# Patient Record
Sex: MALE | Race: WHITE | NOT HISPANIC OR LATINO | Employment: FULL TIME | URBAN - METROPOLITAN AREA
[De-identification: names, ages, dates, MRNs, and addresses within clinical notes are randomized per-mention and may not be internally consistent; named-entity substitution may affect disease eponyms.]

---

## 2017-02-01 DIAGNOSIS — M54.17 LUMBOSACRAL RADICULOPATHY: ICD-10-CM

## 2017-02-01 NOTE — TELEPHONE ENCOUNTER
Last seen: 12/29/16 by Dr. Erwin  Next appt: 03/10/17 with Dr. Finney    Was the patient seen in the last year in this department? Yes   Does patient have an active prescription for medications requested? No   Received Request Via: Pharmacy          TENAZULMA   Age: 46 demographics Data as of: 2/1/2017     Rx Graph   Narcotic Sedative Stimulant   ALL PRESCRIBERS   02/[id number] - JIMMIE, MARIETTA2 - ZOLLINGER, CLAUDIA3 - RODRI, MICHEALA4 - CHELSIE, AMEE5 - MD SHYAM, RADHA6 - KATTY, SAILAJA7 - JR VIZCAINO MD, [place]8 - RENE CHANG   Morphine MgEq/day   02/[id number]     Data Analysis     Prescriptions Total Prescriptions: 22 Active MME: 0.00 Active MME/day: 0.00 30 Day Avg. MME/day: 5.00   Fill Date  Drug Qty Days Prescriber Pharmacy Refill MgEq MgEq/Day Pymt Type    01/10/2017  OXYCODONE HCL 5 MG TABLET 20 10 AR DONNIE LONGS  0 150.00 15.00 [facility] [place]   12/11/2016  OXYCODONE HCL 5 MG TABLET 20 10 AR DONNIE LON[facility]  0 150.00 15.00 [facility] [place]   11/14/2016  OXYCODONE HCL 5 MG TABLET 20 5 JE ZOL LONGS  0 150.00 30.00 [facility] [place]   10/11/2016  OXYCODONE HCL 5 MG TABLET 20 20 LA NAM LONGS  0 150.00 7.50 [facility] [place]   09/13/2016  OXYCODONE HCL 5 MG TABLET 20 20 [facility] NAM [facility]ONGS  0 150.00 7.50 [facility] [place]   08/10/2016  OXYCODONE HCL 5 MG TABLET 20 20 LA NAM LONGS  0 150.00 7.50 [facility] [place]   07/08/2016  OXYCODONE HCL 5 MG TABLET 20 20 CRABTREE WAN LONGS  0 150.00 7.50 [facility] [place]   06/09/2016  OXYCODONE HCL 5 MG TABLET 20 20 CRABTREE WAN LONGS  0 150.00 7.50 [facility] [place]

## 2017-02-02 ENCOUNTER — TELEPHONE (OUTPATIENT)
Dept: INTERNAL MEDICINE | Facility: MEDICAL CENTER | Age: 47
End: 2017-02-02

## 2017-02-02 DIAGNOSIS — M54.17 LUMBOSACRAL RADICULOPATHY: ICD-10-CM

## 2017-02-02 RX ORDER — OXYCODONE HYDROCHLORIDE 5 MG/1
5 TABLET ORAL
Qty: 20 TAB | Refills: 0 | Status: SHIPPED | OUTPATIENT
Start: 2017-02-02 | End: 2017-03-10 | Stop reason: SDUPTHER

## 2017-02-02 RX ORDER — OXYCODONE HYDROCHLORIDE 5 MG/1
5 TABLET ORAL
Qty: 20 TAB | Refills: 0 | Status: CANCELLED | OUTPATIENT
Start: 2017-03-02

## 2017-02-02 NOTE — TELEPHONE ENCOUNTER
Call Documentation      Luann Finney M.D. at 2/2/2017 10:20 AM      Status: Signed         Expand All Collapse All    Refilled Roxicodone 20 tab and gave the prescription to Shira. Thank you.

## 2017-02-02 NOTE — TELEPHONE ENCOUNTER
I checked about this with my attending  and  when the patient was here and i was told that we are very strict on narcotics prescription now. I was told , no more giving the advanced postdated Narcotic refills. Thank you.

## 2017-02-02 NOTE — TELEPHONE ENCOUNTER
Pt is asking for at least 1 more month so he doesn't have to make multiple trips to the office. (We usually give him 3 months at a time). I set up the next RX to sign for March, if you are comfortable to write for April with start date of 4/2 please do so. (He is a reliable pt)

## 2017-03-01 NOTE — TELEPHONE ENCOUNTER
Last seen: 12/29/16 by Dr. Erwin  Next appt: 3/10/17 with Dr. Finney    Was the patient seen in the last year in this department? Yes   Does patient have an active prescription for medications requested? No   Received Request Via: Pharmacy

## 2017-03-02 RX ORDER — GABAPENTIN 300 MG/1
CAPSULE ORAL
Qty: 90 CAP | Refills: 1 | Status: SHIPPED | OUTPATIENT
Start: 2017-03-02 | End: 2017-07-26 | Stop reason: SDUPTHER

## 2017-03-10 ENCOUNTER — OFFICE VISIT (OUTPATIENT)
Dept: INTERNAL MEDICINE | Facility: MEDICAL CENTER | Age: 47
End: 2017-03-10
Payer: COMMERCIAL

## 2017-03-10 VITALS
HEIGHT: 75 IN | SYSTOLIC BLOOD PRESSURE: 144 MMHG | DIASTOLIC BLOOD PRESSURE: 110 MMHG | OXYGEN SATURATION: 96 % | WEIGHT: 210.8 LBS | HEART RATE: 84 BPM | BODY MASS INDEX: 26.21 KG/M2 | TEMPERATURE: 97.4 F

## 2017-03-10 DIAGNOSIS — M54.17 LUMBOSACRAL RADICULOPATHY: ICD-10-CM

## 2017-03-10 DIAGNOSIS — Z79.891 CHRONIC USE OF OPIATE DRUGS THERAPEUTIC PURPOSES: ICD-10-CM

## 2017-03-10 DIAGNOSIS — M47.812 OSTEOARTHRITIS OF CERVICAL SPINE, UNSPECIFIED SPINAL OSTEOARTHRITIS COMPLICATION STATUS: ICD-10-CM

## 2017-03-10 DIAGNOSIS — I10 ESSENTIAL HYPERTENSION: ICD-10-CM

## 2017-03-10 PROBLEM — D50.9 IRON DEFICIENCY ANEMIA: Status: ACTIVE | Noted: 2017-03-10

## 2017-03-10 PROBLEM — D50.9 IRON DEFICIENCY ANEMIA: Status: RESOLVED | Noted: 2017-03-10 | Resolved: 2017-03-10

## 2017-03-10 PROCEDURE — 99214 OFFICE O/P EST MOD 30 MIN: CPT | Mod: GC | Performed by: INTERNAL MEDICINE

## 2017-03-10 RX ORDER — TADALAFIL 2.5 MG/1
2.5 TABLET ORAL
Qty: 30 TAB | Refills: 4 | Status: SHIPPED
Start: 2017-03-10 | End: 2017-10-04 | Stop reason: SDUPTHER

## 2017-03-10 RX ORDER — OXYCODONE HYDROCHLORIDE 5 MG/1
5 TABLET ORAL
Qty: 30 TAB | Refills: 0 | Status: SHIPPED | OUTPATIENT
Start: 2017-03-10 | End: 2017-04-13 | Stop reason: SDUPTHER

## 2017-03-10 ASSESSMENT — PAIN SCALES - GENERAL: PAINLEVEL: 5=MODERATE PAIN

## 2017-03-10 ASSESSMENT — LIFESTYLE VARIABLES: HISTORY_ALCOHOL_USE: 1

## 2017-03-10 ASSESSMENT — ENCOUNTER SYMPTOMS: DEPRESSION: 0

## 2017-03-10 NOTE — TELEPHONE ENCOUNTER
Last seen: 03/10/17 by Dr. Stewart  Next appt: 04/13/17 with Dr. Finney    Was the patient seen in the last year in this department? Yes   Does patient have an active prescription for medications requested? No   Received Request Via: Pharmacy

## 2017-03-10 NOTE — MR AVS SNAPSHOT
"        Gabriele Bland   3/10/2017 8:00 AM   Office Visit   MRN: [id number]    Department:  [facility] - Internal Med   Dept Phone:  [phone]    Description:  Male : 1970   Provider:  Luann Finney M.D.           Reason for Visit     Follow-Up follow up    Medication Refill oxycodone    Ear Pain right ear, thinks might have an ear infection      Allergies as of 3/10/2017     Allergen Noted Reactions    Food 2013       Tree nuts    Ibuprofen 2015       Naproxen 2015       Pcn [Penicillins] 2013       Tylenol 2015         You were diagnosed with     Lumbosacral radiculopathy   [783751]       Osteoarthritis of cervical spine, unspecified spinal osteoarthritis complication status   [3131387]       Essential hypertension   [6440035]         Vital Signs     Blood Pressure Pulse Temperature Height Weight Body Mass Index    144/110 mmHg 84 36.3 °C (97.4 °F) 1.905 m (6' 3\") 95.618 kg (210 lb 12.8 oz) 26.35 kg/m2    Oxygen Saturation Smoking Status                96% Current Every Day Smoker          Basic Information     Date Of Birth Sex Race Ethnicity Preferred Language    1970 Male White Non- English      Your appointments     2017  1:15 PM   Established Patient with Luann Finney M.D.   [facility] - Internal Medicine (--)    [address] [place] [id number] [phone]           You will be receiving a confirmation call a few days before your appointment from our automated call confirmation system.              Problem List              ICD-10-CM Priority Class Noted - Resolved    Vertigo R42   2013 - Present    Cerebellar infarct (HCC) I63.9   2013 - Present    Radiculopathy M54.10   5/10/2016 - Present    Cervical spondylosis M47.812   2016 - Present    Encounter for smoking cessation counseling Z71.6, Z72.0   2016 - Present    Preventative health care Z00.00   2016 - Present   " Encounter for long-term opiate analgesic use Z79.891   12/29/2016 - Present      Health Maintenance        Date Due Completion Dates    IMM PNEUMOCOCCAL 19-64 (ADULT) MEDIUM RISK SERIES (1 of 1 - PPSV23) 12/28/1988 ---    IMM INFLUENZA (1) 9/1/2016 ---    IMM DTaP/Tdap/Td Vaccine (2 - Td) 6/15/2024 6/15/2014            Current Immunizations     Tdap Vaccine 6/15/2014      Below and/or attached are the medications your provider expects you to take. Review all of your home medications and newly ordered medications with your provider and/or pharmacist. Follow medication instructions as directed by your provider and/or pharmacist. Please keep your medication list with you and share with your provider. Update the information when medications are discontinued, doses are changed, or new medications (including over-the-counter products) are added; and carry medication information at all times in the event of emergency situations     Allergies:  FOOD - (reactions not documented)     IBUPROFEN - (reactions not documented)     NAPROXEN - (reactions not documented)     PCN - (reactions not documented)     TYLENOL - (reactions not documented)               Medications  Valid as of: March 10, 2017 -  8:46 AM    Generic Name Brand Name Tablet Size Instructions for use    Gabapentin (Cap) NEURONTIN 300 MG TAKE ONE CAPSULE BY MOUTH AT BEDTIME        Krill Oil (Cap) Krill Oil 300 MG Take  by mouth.        Multiple Vitamins-Minerals (Tab) THERAGRAN-M  Take 1 Tab by mouth every day.        Omega-3 Fatty Acids (Cap) fish oil 1000 MG Take 1,000 mg by mouth 3 times a day, with meals.        OxyCODONE HCl (Tab) ROXICODONE 5 MG Take 1 Tab by mouth 1 time daily as needed for Severe Pain.        Tadalafil (Tab) Tadalafil 2.5 MG Take  by mouth.        .                 Medicines prescribed today were sent to:     [facility][id number] - [place] - [address]    [address] [place] [address]    Phone: [phone] Fax: [phone]    Open 24  Hours?: No      Medication refill instructions:       If your prescription bottle indicates you have medication refills left, it is not necessary to call your provider’s office. Please contact your pharmacy and they will refill your medication.    If your prescription bottle indicates you do not have any refills left, you may request refills at any time through one of the following ways: The online [employer] system (except Urgent Care), by calling your provider’s office, or by asking your pharmacy to contact your provider’s office with a refill request. Medication refills are processed only during regular business hours and may not be available until the next business day. Your provider may request additional information or to have a follow-up visit with you prior to refilling your medication.   *Please Note: Medication refills are assigned a new Rx number when refilled electronically. Your pharmacy may indicate that no refills were authorized even though a new prescription for the same medication is available at the pharmacy. Please request the medicine by name with the pharmacy before contacting your provider for a refill.        Instructions    Hypertension  Hypertension, commonly called high blood pressure, is when the force of blood pumping through your arteries is too strong. Your arteries are the blood vessels that carry blood from your heart throughout your body. A blood pressure reading consists of a higher number over a lower number, such as 110/72. The higher number (systolic) is the pressure inside your arteries when your heart pumps. The lower number (diastolic) is the pressure inside your arteries when your heart relaxes. Ideally you want your blood pressure below 120/80.  Hypertension forces your heart to work harder to pump blood. Your arteries may become narrow or stiff. Having untreated or uncontrolled hypertension can cause heart attack, stroke, kidney disease, and other problems.  RISK  FACTORS  Some risk factors for high blood pressure are controllable. Others are not.   Risk factors you cannot control include:   · Race. You may be at higher risk if you are .  · Age. Risk increases with age.  · Gender. Men are at higher risk than women before age 45 years. After age 65, women are at higher risk than men.  Risk factors you can control include:  · Not getting enough exercise or physical activity.  · Being overweight.  · Getting too much fat, sugar, calories, or salt in your diet.  · Drinking too much alcohol.  SIGNS AND SYMPTOMS  Hypertension does not usually cause signs or symptoms. Extremely high blood pressure (hypertensive crisis) may cause headache, anxiety, shortness of breath, and nosebleed.  DIAGNOSIS  To check if you have hypertension, your health care provider will measure your blood pressure while you are seated, with your arm held at the level of your heart. It should be measured at least twice using the same arm. Certain conditions can cause a difference in blood pressure between your right and left arms. A blood pressure reading that is higher than normal on one occasion does not mean that you need treatment. If it is not clear whether you have high blood pressure, you may be asked to return on a different day to have your blood pressure checked again. Or, you may be asked to monitor your blood pressure at home for 1 or more weeks.  TREATMENT  Treating high blood pressure includes making lifestyle changes and possibly taking medicine. Living a healthy lifestyle can help lower high blood pressure. You may need to change some of your habits.  Lifestyle changes may include:  · Following the DASH diet. This diet is high in fruits, vegetables, and whole grains. It is low in salt, red meat, and added sugars.  · Keep your sodium intake below 2,300 mg per day.  · Getting at least 30-45 minutes of aerobic exercise at least 4 times per week.  · Losing weight if necessary.  · Not  smoking.  · Limiting alcoholic beverages.  · Learning ways to reduce stress.  Your health care provider may prescribe medicine if lifestyle changes are not enough to get your blood pressure under control, and if one of the following is true:  · You are 18-59 years of age and your systolic blood pressure is above 140.  · You are 60 years of age or older, and your systolic blood pressure is above 150.  · Your diastolic blood pressure is above 90.  · You have diabetes, and your systolic blood pressure is over 140 or your diastolic blood pressure is over 90.  · You have kidney disease and your blood pressure is above 140/90.  · You have heart disease and your blood pressure is above 140/90.  Your personal target blood pressure may vary depending on your medical conditions, your age, and other factors.  HOME CARE INSTRUCTIONS  · Have your blood pressure rechecked as directed by your health care provider.    · Take medicines only as directed by your health care provider. Follow the directions carefully. Blood pressure medicines must be taken as prescribed. The medicine does not work as well when you skip doses. Skipping doses also puts you at risk for problems.  · Do not smoke.    · Monitor your blood pressure at home as directed by your health care provider.   SEEK MEDICAL CARE IF:   · You think you are having a reaction to medicines taken.  · You have recurrent headaches or feel dizzy.  · You have swelling in your ankles.  · You have trouble with your vision.  SEEK IMMEDIATE MEDICAL CARE IF:  · You develop a severe headache or confusion.  · You have unusual weakness, numbness, or feel faint.  · You have severe chest or abdominal pain.  · You vomit repeatedly.  · You have trouble breathing.  MAKE SURE YOU:   · Understand these instructions.  · Will watch your condition.  · Will get help right away if you are not doing well or get worse.     This information is not intended to replace advice given to you by your health  care provider. Make sure you discuss any questions you have with your health care provider.     Document Released: 12/18/2006 Document Revised: 05/03/2016 Document Reviewed: 10/10/2014  [employer] Interactive Patient Education ©2016 Elsevier Inc.  ----------------------------------------------------------------------  1. Counseled for lifestyle modification for reducing the BP.  2. Check the BP dairy and bring it for next visit.  3. Refill of Oxycodone for 1 month.            Retrevo Access Code: [email or web address]  Expires: 4/8/2017  8:40 AM    Retrevo  A secure, online tool to manage your health information     [employer] Retrevo® is a secure, online tool that connects you to your personalized health information from the privacy of your home -- day or night - making it very easy for you to manage your healthcare. Once the activation process is completed, you can even access your medical information using the Retrevo steve, which is available for free in the Apple Steve store or Google Play store.     Retrevo provides the following levels of access (as shown below):   My Chart Features   [facility] Doctor [facility]  Specialists [facility]  Urgent  Care [facility]  Primary Care  Doctor   Email your healthcare team securely and privately 24/7 X X X    Manage appointments: schedule your next appointment; view details of past/upcoming appointments X      Request prescription refills. X      View recent personal medical records, including lab and immunizations X X X X   View health record, including health history, allergies, medications X X X X   Read reports about your outpatient visits, procedures, consult and ER notes X X X X   See your discharge summary, which is a recap of your hospital and/or ER visit that includes your diagnosis, lab results, and care plan. X X       How to register for Retrevo:  1. Go to  [email or web address].  2. Click on the Sign Up Now box, which takes you to the New Member Sign Up  page. You will need to provide the following information:  a. Enter your [employer] Access Code exactly as it appears at the top of this page. (You will not need to use this code after you’ve completed the sign-up process. If you do not sign up before the expiration date, you must request a new code.)   b. Enter your date of birth.   c. Enter your home email address.   d. Click Submit, and follow the next screen’s instructions.  3. Create a Ridley ID. This will be your Ridley login ID and cannot be changed, so think of one that is secure and easy to remember.  4. Create a Ridley password. You can change your password at any time.  5. Enter your Password Reset Question and Answer. This can be used at a later time if you forget your password.   6. Enter your e-mail address. This allows you to receive e-mail notifications when new information is available in Ridley.  7. Click Sign Up. You can now view your health information.    For assistance activating your Ridley account, call [phone]        Quit Tobacco Information     Do you want to quit using tobacco?    Quitting tobacco decreases risks of cancer, heart and lung disease, increases life expectancy, improves sense of taste and smell, and increases spending money, among other benefits.    If you are thinking about quitting, we can help.  • [facility] Quit Tobacco Program: 646.351.7076  o Program occurs weekly for four weeks and includes pharmacist consultation on products to support quitting smoking or chewing tobacco. A provider referral is needed for pharmacist consultation.  • Tobacco Users Help Hotline: 3-562-QUIT-NOW (955-9932) or https://[place].quitlogix.org/  o Free, confidential telephone and online coaching for [place] residents. Sessions are designed on a schedule that is convenient for you. Eligible clients receive free nicotine replacement therapy.  • Nationally: www.smokefree.gov  o Information and professional assistance to support both immediate  and long-term needs as you become, and remain, a non-smoker. Smokefree.gov allows you to choose the help that best fits your needs.

## 2017-03-10 NOTE — PATIENT INSTRUCTIONS

## 2017-03-11 NOTE — PROGRESS NOTES
Established Patient    Gabriele presents today with the following:    CC: Refills of Oxycodone    HPI:   46 Y M with PMH of Chronic Lower back pain after accidental fall with the elevator, Left lower extremity numbness and Neuropathic pain after tendon rupture.     Lower back pain  C/O pain in the lower back during his ADL's and takes regularly 1- 1& 1/2 tab of Oxycodone every night which gives him relief. Regularly gets refills from our clinic. Previously refilled by  for 20 tab/month for 3 months with postdated prescriptions.MORALES checked which is clean. UDS done in previous visits. We have counseled him to wean down from Narcotic drugs which the patient understands and he has tried it in the past which did not work. Pain management referral was given and he visited [facility] pain clinic who did not want to do anything for his back pain and recommended to F/U with PCP for Oxycodone low dose refills which he is on currently.    Hypertension  Newly diagnosed as his BP has been consistently elevated for past 3 visits. He admits that he has lot of stress at his work. Denies to take any antihypertensives at this time and prefers lifestyle modifications. Denies headaches, blurry vision, Chest pain or focal weakness.      Chronic pain recheck:   Last dose of controlled substance: Yesterday  Chronic pain treated with Oxycodone 5mg QHS    He  reports that he drinks about 8.4 oz of alcohol per week.  He  reports that he does not use illicit drugs.    Consequences of Chronic Opiate therapy:  (5 A's)  Analgesia: Compared to no treatment or prior treatment, pain is currently not changed  Activity: not changed  Adverse Events: He denies constipation, dry mouth, itchy skin, nausea and sedation  Aberrant Behaviors: He reports he is taking medication as prescribed and is not veering from agreed treatment regimen. There have been no inappropriate refills or lost/stolen meds reported.   Affect/Mood: Pain is not impacting  patient's mood.  Patient denies depression/anxiety.    Nonnarcotic treatments that are being used: None  Treatment goals discussed.    Opioid Risk Score: 8    Interpretation of Opioid Risk Score   Score 0-3 = Low risk of abuse. Do UDS at least once per year.  Score 4-7 = Moderate risk of abuse. Do UDS 1-4 times per year.  Score 8+ = High risk of abuse. Refer to specialist.    Last order of CONTROLLED SUBSTANCE TREATMENT AGREEMENT was found on 8/10/2016 from Office Visit on 8/10/2016     UDS Summary     Patient has no health maintenance due at this time        Most recent UDS reviewed today and is consistent with prescribed medications.    I have reviewed the medical records, the Prescription Monitoring Program and I have determined that controlled substance treatment is medically indicated.        Last order of CONTROLLED SUBSTANCE TREATMENT AGREEMENT was found on 8/10/2016 from Office Visit on 8/10/2016     UDS Summary     Patient has no health maintenance due at this time        Most recent UDS reviewed today and is consistent with prescribed medications.    I have reviewed the medical records, the Prescription Monitoring Program and I have determined that controlled substance treatment is medically indicated.        Patient Active Problem List    Diagnosis Date Noted   • Cervical spondylosis 12/29/2016   • Encounter for smoking cessation counseling 12/29/2016   • Preventative health care 12/29/2016   • Encounter for long-term opiate analgesic use 12/29/2016   • Radiculopathy 05/10/2016   • Cerebellar infarct (HCC) 04/06/2013   • Vertigo 04/05/2013       Current Outpatient Prescriptions   Medication Sig Dispense Refill   • Krill Oil 300 MG Cap Take  by mouth.     • oxycodone immediate-release (ROXICODONE) 5 MG Tab Take 1 Tab by mouth 1 time daily as needed for Severe Pain. 30 Tab 0   • gabapentin (NEURONTIN) 300 MG Cap TAKE ONE CAPSULE BY MOUTH AT BEDTIME 90 Cap 1   • therapeutic multivitamin-minerals  "(THERAGRAN-M) Tab Take 1 Tab by mouth every day.     • Omega-3 Fatty Acids (FISH OIL) 1000 MG Cap capsule Take 1,000 mg by mouth 3 times a day, with meals.     • Tadalafil 2.5 MG Tab Take  by mouth.       No current facility-administered medications for this visit.       ROS: As per HPI. Additional pertinent symptoms as noted below.    Constitutional: Denies fevers or chills  Eyes: Denies changes in vision  Ears/Nose/Throat/Mouth: Denies nasal congestion or sore throat   Cardiovascular: Denies chest pain or palpitations   Respiratory: Denies shortness of breath , Denies cough  Gastrointestinal/Hepatic: Denies abd pain, nausea, vomiting   Genitourinary: Denies dysuria or frequency  Musculoskeletal/Rheum: HPI  Neurological: Denies headache  Psychiatric: Denies mood disorder   Endocrine: Denies hx of diabetes or thyroid dysfunction  Heme/Oncology/Lymph Nodes: Denies weight changes or enlarged LNs.   Allergic/Immunologic: Denies hx of allergies       /110 mmHg  Pulse 84  Temp(Src) 36.3 °C (97.4 °F)  Ht 1.905 m (6' 3\")  Wt 95.618 kg (210 lb 12.8 oz)  BMI 26.35 kg/m2  SpO2 96%    Physical Exam   Constitutional:  oriented to person, place, and time. No distress.   Eyes: Pupils are equal, round, and reactive to light. No scleral icterus.  Neck: Neck supple. No thyromegaly present.   Cardiovascular: Normal rate, regular rhythm and normal heart sounds.  Exam reveals no gallop and no friction rub.  No murmur heard.  Pulmonary/Chest: Breath sounds normal. Chest wall is not dull to percussion.   Musculoskeletal:   no edema. Decreased ROM of cervical spine and Lumbar spine.   Lymphadenopathy: no cervical adenopathy  Neurological: alert and oriented to person, place, and time.   Skin: No cyanosis. Nails show no clubbing.        Assessment and Plan    1. Lumbosacral radiculopathy  - We will refill his Oxycodone 5 mg for 30 days.  - All the risks and benefits discussed with the pt which he understands.  ORDERS  - " oxycodone immediate-release (ROXICODONE) 5 MG Tab; Take 1 Tab by mouth 1 time daily as needed for Severe Pain.  Dispense: 30 Tab; Refill: 0    2. Essential hypertension  - Elevated BP in 3 consecutive visits and Wt. Gain.  - Offered him for low dose Lisinopril/HCTZ which the pt refused to start at this time.  - Counseled on Lifestyle modifications for avoiding salt , Moderate exercise and maintain a BP dairy which will be reviewed in his next visit.    3. Chronic use of opiate drugs therapeutic purposes  Please see above in HPI , for filled in Opioid risk calculator .    Followup: Return in about 5 weeks (around 4/14/2017).      Signed by: Luann Finney M.D.

## 2017-04-13 ENCOUNTER — OFFICE VISIT (OUTPATIENT)
Dept: INTERNAL MEDICINE | Facility: MEDICAL CENTER | Age: 47
End: 2017-04-13
Payer: COMMERCIAL

## 2017-04-13 VITALS
HEART RATE: 69 BPM | DIASTOLIC BLOOD PRESSURE: 92 MMHG | SYSTOLIC BLOOD PRESSURE: 137 MMHG | HEIGHT: 75 IN | TEMPERATURE: 98.8 F | OXYGEN SATURATION: 97 % | WEIGHT: 204.6 LBS | BODY MASS INDEX: 25.44 KG/M2

## 2017-04-13 DIAGNOSIS — Z79.891 CHRONIC USE OF OPIATE DRUGS THERAPEUTIC PURPOSES: ICD-10-CM

## 2017-04-13 DIAGNOSIS — M54.17 LUMBOSACRAL RADICULOPATHY: ICD-10-CM

## 2017-04-13 DIAGNOSIS — M47.812 OSTEOARTHRITIS OF CERVICAL SPINE, UNSPECIFIED SPINAL OSTEOARTHRITIS COMPLICATION STATUS: ICD-10-CM

## 2017-04-13 PROCEDURE — 99213 OFFICE O/P EST LOW 20 MIN: CPT | Mod: GE | Performed by: INTERNAL MEDICINE

## 2017-04-13 RX ORDER — OXYCODONE HYDROCHLORIDE 5 MG/1
5 TABLET ORAL
Qty: 30 TAB | Refills: 0 | Status: SHIPPED | OUTPATIENT
Start: 2017-05-14 | End: 2017-07-26 | Stop reason: SDUPTHER

## 2017-04-13 RX ORDER — OXYCODONE HYDROCHLORIDE 5 MG/1
5 TABLET ORAL
Qty: 30 TAB | Refills: 0 | Status: SHIPPED | OUTPATIENT
Start: 2017-04-13 | End: 2017-07-26 | Stop reason: SDUPTHER

## 2017-04-13 RX ORDER — OXYCODONE HYDROCHLORIDE 5 MG/1
5 TABLET ORAL
Qty: 30 TAB | Refills: 0 | Status: SHIPPED | OUTPATIENT
Start: 2017-06-15 | End: 2018-01-10

## 2017-04-13 ASSESSMENT — PAIN SCALES - GENERAL: PAINLEVEL: 4=SLIGHT-MODERATE PAIN

## 2017-04-13 NOTE — MR AVS SNAPSHOT
"Gabriele Bland   2017 1:15 PM   Office Visit   MRN: [id number]    Department:  [facility]   Dept Phone:  [phone]    Description:  Male : 1970   Provider:  Luann Finney M.D.           Reason for Visit     Follow-Up           Allergies as of 2017     Allergen Noted Reactions    Food 2013       Tree nuts    Ibuprofen 2015       Naproxen 2015       Pcn [Penicillins] 2013       Tylenol 2015         You were diagnosed with     Lumbosacral radiculopathy   [037512]       Osteoarthritis of cervical spine, unspecified spinal osteoarthritis complication status   [0662126]         Vital Signs     Blood Pressure Pulse Temperature Height Weight Body Mass Index    137/92 mmHg 69 37.1 °C (98.8 °F) 1.905 m (6' 3\") 92.806 kg (204 lb 9.6 oz) 25.57 kg/m2    Oxygen Saturation Smoking Status                97% Current Every Day Smoker          Basic Information     Date Of Birth Sex Race Ethnicity Preferred Language    1970 Male White Non- English      Problem List              ICD-10-CM Priority Class Noted - Resolved    Vertigo R42   2013 - Present    Cerebellar infarct (HCC) I63.9   2013 - Present    Radiculopathy M54.10   5/10/2016 - Present    Cervical spondylosis M47.812   2016 - Present    Encounter for smoking cessation counseling Z71.6, Z72.0   2016 - Present    Preventative health care Z00.00   2016 - Present    Encounter for long-term opiate analgesic use Z79.891   2016 - Present    Chronic use of opiate drugs therapeutic purposes Z79.899   3/10/2017 - Present      Health Maintenance        Date Due Completion Dates    IMM PNEUMOCOCCAL 19-64 (ADULT) MEDIUM RISK SERIES (1 of 1 - PPSV23) 1988 ---    IMM DTaP/Tdap/Td Vaccine (2 - Td) 6/15/2024 6/15/2014            Current Immunizations     Tdap Vaccine 6/15/2014      Below and/or attached are the medications your provider expects you to take. Review " all of your home medications and newly ordered medications with your provider and/or pharmacist. Follow medication instructions as directed by your provider and/or pharmacist. Please keep your medication list with you and share with your provider. Update the information when medications are discontinued, doses are changed, or new medications (including over-the-counter products) are added; and carry medication information at all times in the event of emergency situations     Allergies:  FOOD - (reactions not documented)     IBUPROFEN - (reactions not documented)     NAPROXEN - (reactions not documented)     PCN - (reactions not documented)     TYLENOL - (reactions not documented)               Medications  Valid as of: April 13, 2017 -  1:42 PM    Generic Name Brand Name Tablet Size Instructions for use    Gabapentin (Cap) NEURONTIN 300 MG TAKE ONE CAPSULE BY MOUTH AT BEDTIME        Krill Oil (Cap) Krill Oil 300 MG Take  by mouth.        Glo Root   Take  by mouth.        Multiple Vitamins-Minerals (Tab) THERAGRAN-M  Take 1 Tab by mouth every day.        Omega-3 Fatty Acids (Cap) fish oil 1000 MG Take 1,000 mg by mouth 3 times a day, with meals.        OxyCODONE HCl (Tab) ROXICODONE 5 MG Take 1 Tab by mouth 1 time daily as needed for Severe Pain.        OxyCODONE HCl (Tab) ROXICODONE 5 MG Take 1 Tab by mouth 1 time daily as needed for Severe Pain.        OxyCODONE HCl (Tab) ROXICODONE 5 MG Take 1 Tab by mouth 1 time daily as needed for Severe Pain.        Tadalafil (Tab) Tadalafil 2.5 MG Take 2.5 Tabs by mouth 1 time daily as needed.        .                 Medicines prescribed today were sent to:     [id number] - [place] - [address] [address]    Phone: [phone] Fax: [phone]    Open 24 Hours?: No      Medication refill instructions:       If your prescription bottle indicates you have medication refills left, it is not necessary to call your provider’s office. Please contact  your pharmacy and they will refill your medication.    If your prescription bottle indicates you do not have any refills left, you may request refills at any time through one of the following ways: The online [employer] system (except Urgent Care), by calling your provider’s office, or by asking your pharmacy to contact your provider’s office with a refill request. Medication refills are processed only during regular business hours and may not be available until the next business day. Your provider may request additional information or to have a follow-up visit with you prior to refilling your medication.   *Please Note: Medication refills are assigned a new Rx number when refilled electronically. Your pharmacy may indicate that no refills were authorized even though a new prescription for the same medication is available at the pharmacy. Please request the medicine by name with the pharmacy before contacting your provider for a refill.        Instructions    Chronic Back Pain   When back pain lasts longer than 3 months, it is called chronic back pain. People with chronic back pain often go through certain periods that are more intense (flare-ups).   CAUSES  Chronic back pain can be caused by wear and tear (degeneration) on different structures in your back. These structures include:  · The bones of your spine (vertebrae) and the joints surrounding your spinal cord and nerve roots (facets).  · The strong, fibrous tissues that connect your vertebrae (ligaments).  Degeneration of these structures may result in pressure on your nerves. This can lead to constant pain.  HOME CARE INSTRUCTIONS  · Avoid bending, heavy lifting, prolonged sitting, and activities which make the problem worse.  · Take brief periods of rest throughout the day to reduce your pain. Lying down or standing usually is better than sitting while you are resting.  · Take over-the-counter or prescription medicines only as directed by your caregiver.  SEEK  IMMEDIATE MEDICAL CARE IF:   · You have weakness or numbness in one of your legs or feet.  · You have trouble controlling your bladder or bowels.  · You have nausea, vomiting, abdominal pain, shortness of breath, or fainting.     This information is not intended to replace advice given to you by your health care provider. Make sure you discuss any questions you have with your health care provider.     Document Released: 01/25/2006 Document Revised: 03/11/2013 Document Reviewed: 12/01/2012  [employer] Interactive Patient Education ©2016 Elsevier Inc.  ----------------------------------------------------------  1. Refilled till 07/16 /2017.  2. Next appointment after 07/16/2017          [employer] Access Code: [email or web address]  Expires: 5/10/2017 12:17 PM    [employer]  A secure, online tool to manage your health information     [employer]® is a secure, online tool that connects you to your personalized health information from the privacy of your home -- day or night - making it very easy for you to manage your healthcare. Once the activation process is completed, you can even access your medical information using the [employer] steve, which is available for free in the [employer] store or Google Play store.     [employer] provides the following levels of access (as shown below):   My Chart Features   [facility]  Specialists [facility]  Urgent  Care [facility]  Primary Care  Doctor   Email your healthcare team securely and privately 24/7 X X X    Manage appointments: schedule your next appointment; view details of past/upcoming appointments X      Request prescription refills. X      View recent personal medical records, including lab and immunizations X X X X   View health record, including health history, allergies, medications X X X X   Read reports about your outpatient visits, procedures, consult and ER notes X X X X   See your discharge summary, which is a recap of your hospital and/or ER visit  that includes your diagnosis, lab results, and care plan. X X       How to register for [employer]:  1. Go to  [email or web address].  2. Click on the Sign Up Now box, which takes you to the New Member Sign Up page. You will need to provide the following information:  a. Enter your [employer] Access Code exactly as it appears at the top of this page. (You will not need to use this code after you’ve completed the sign-up process. If you do not sign up before the expiration date, you must request a new code.)   b. Enter your date of birth.   c. Enter your home email address.   d. Click Submit, and follow the next screen’s instructions.  3. Create a [employer] ID. This will be your [employer] login ID and cannot be changed, so think of one that is secure and easy to remember.  4. Create a [employer] password. You can change your password at any time.  5. Enter your Password Reset Question and Answer. This can be used at a later time if you forget your password.   6. Enter your e-mail address. This allows you to receive e-mail notifications when new information is available in [employer].  7. Click Sign Up. You can now view your health information.    For assistance activating your [employer] account, call [phone]        Quit Tobacco Information     Do you want to quit using tobacco?    Quitting tobacco decreases risks of cancer, heart and lung disease, increases life expectancy, improves sense of taste and smell, and increases spending money, among other benefits.    If you are thinking about quitting, we can help.  • [facility] Quit Tobacco Program: [phone]  o Program occurs weekly for four weeks and includes pharmacist consultation on products to support quitting smoking or chewing tobacco. A provider referral is needed for pharmacist consultation.  • Tobacco Users Help Hotline: -800-QUIT-NOW (970-5173) or https://[place].quitlogix.org/  o Free, confidential telephone and online coaching for [place] residents. Sessions are  designed on a schedule that is convenient for you. Eligible clients receive free nicotine replacement therapy.  • Nationally: www.smokefree.gov  o Information and professional assistance to support both immediate and long-term needs as you become, and remain, a non-smoker. Smokefree.gov allows you to choose the help that best fits your needs.

## 2017-04-13 NOTE — PROGRESS NOTES
Established Patient    Gabriele presents today with the following:    CC: F/U for medication refills    HPI:   46 Y M with PMH of Chronic Lower back pain after accidental fall with the elevator, Left lower extremity numbness and Neuropathic pain after tendon rupture.     Lower back pain  C/O pain in the lower back during his ADL's and takes regularly 1 or 1/2 tab of Oxycodone every night which gives him relief. Regularly gets refills from our clinic. Requests for postdated refills of prescriptions as he is coming only for refill of medication every month. We already checked his UDS and pain contract which is only the Roxicodone we     Elevated BP seen last visit resolved as pt was stressful at that time. Later his home BP checks were all WNL.    Chronic pain recheck:   Last dose of controlled substance: yesterday  Chronic pain treated with Oxycodone 5mg QD taken once a day    He  reports that he drinks about 8.4 oz of alcohol per week.  He  reports that he does not use illicit drugs.    Consequences of Chronic Opiate therapy:  (5 A's)  Analgesia: Compared to no treatment or prior treatment, pain is currently not changed  Activity: improved  Adverse Events: He denies constipation, dry mouth, itchy skin, nausea and sedation  Aberrant Behaviors: He reports he is taking medication as prescribed and is not veering from agreed treatment regimen. There have been no inappropriate refills or lost/stolen meds reported.   Affect/Mood: Pain is impacting patient's mood.  Patient denies depression/anxiety.    Nonnarcotic treatments that are being used: Gabapentin.   Treatment goals discussed.    Opioid Risk Score: 8    Interpretation of Opioid Risk Score   Score 0-3 = Low risk of abuse. Do UDS at least once per year.  Score 4-7 = Moderate risk of abuse. Do UDS 1-4 times per year.  Score 8+ = High risk of abuse. Refer to specialist.    Last order of CONTROLLED SUBSTANCE TREATMENT AGREEMENT was found on 8/10/2016 from Office  Visit on 8/10/2016     UDS Summary                URINE DRUG SCREEN Next Due 8/5/2017      Done 8/10/2016 PAIN MANAGEMENT PANEL, SCRN W/ RFLX TO QNT        Most recent UDS reviewed today and is consistent with prescribed medications.    I have reviewed the medical records, the Prescription Monitoring Program and I have determined that controlled substance treatment is medically indicated.        Patient Active Problem List    Diagnosis Date Noted   • Chronic use of opiate drugs therapeutic purposes 03/10/2017   • Cervical spondylosis 12/29/2016   • Encounter for smoking cessation counseling 12/29/2016   • Preventative health care 12/29/2016   • Encounter for long-term opiate analgesic use 12/29/2016   • Radiculopathy 05/10/2016   • Cerebellar infarct (HCC) 04/06/2013   • Vertigo 04/05/2013       Current Outpatient Prescriptions   Medication Sig Dispense Refill   • MILI ROOT PO Take  by mouth.     • oxycodone immediate-release (ROXICODONE) 5 MG Tab Take 1 Tab by mouth 1 time daily as needed for Severe Pain. 30 Tab 0   • [START ON 5/14/2017] oxycodone immediate-release (ROXICODONE) 5 MG Tab Take 1 Tab by mouth 1 time daily as needed for Severe Pain. 30 Tab 0   • [START ON 6/15/2017] oxycodone immediate-release (ROXICODONE) 5 MG Tab Take 1 Tab by mouth 1 time daily as needed for Severe Pain. 30 Tab 0   • Krill Oil 300 MG Cap Take  by mouth.     • Tadalafil 2.5 MG Tab Take 2.5 Tabs by mouth 1 time daily as needed. 30 Tab 4   • gabapentin (NEURONTIN) 300 MG Cap TAKE ONE CAPSULE BY MOUTH AT BEDTIME 90 Cap 1   • therapeutic multivitamin-minerals (THERAGRAN-M) Tab Take 1 Tab by mouth every day.     • Omega-3 Fatty Acids (FISH OIL) 1000 MG Cap capsule Take 1,000 mg by mouth 3 times a day, with meals.       No current facility-administered medications for this visit.       ROS: As per HPI. Additional pertinent symptoms as noted below.  Constitutional: Denies fevers or chills  Eyes: Denies changes in  "vision  Ears/Nose/Throat/Mouth: Denies nasal congestion or sore throat   Cardiovascular: Denies chest pain or palpitations   Respiratory: Denies shortness of breath , Denies cough  Gastrointestinal/Hepatic: Denies abd pain, nausea, vomiting   Genitourinary: Denies dysuria or frequency  Musculoskeletal/Rheum: HPI  Neurological: Denies headache  Psychiatric: Denies mood disorder   Endocrine: Denies hx of diabetes or thyroid dysfunction  Heme/Oncology/Lymph Nodes: Denies weight changes or enlarged LNs.   Allergic/Immunologic: Denies hx of allergies       /92 mmHg  Pulse 69  Temp(Src) 37.1 °C (98.8 °F)  Ht 1.905 m (6' 3\")  Wt 92.806 kg (204 lb 9.6 oz)  BMI 25.57 kg/m2  SpO2 97%    Physical Exam   Constitutional:  oriented to person, place, and time. No distress.   Eyes: Pupils are equal, round, and reactive to light. No scleral icterus.  Neck: Neck supple. No thyromegaly present.   Cardiovascular: Normal rate, regular rhythm and normal heart sounds.  Exam reveals no gallop and no friction rub.  No murmur heard.  Pulmonary/Chest: Breath sounds normal. Chest wall is not dull to percussion.   Musculoskeletal:   no edema. Tenderness in spinal and paraspinal tenderness.  Lymphadenopathy: no cervical adenopathy  Neurological: alert and oriented to person, place, and time.   Skin: No cyanosis. Nails show no clubbing.        Assessment and Plan    1. Lumbosacral radiculopathy  - We will refill his Oxycodone 5 mg for 30 days and give postdated prescriptions with instructions to give refills as given in the instructions regarding the dates of refill .  - All the risks and benefits discussed with the pt which he understands.  ORDERS  - oxycodone immediate-release (ROXICODONE) 5 MG Tab; Take 1 Tab by mouth 1 time daily as needed for Severe Pain.  Dispense: 30 Tab; Refill: 0  - oxycodone immediate-release (ROXICODONE) 5 MG Tab; Take 1 Tab by mouth 1 time daily as needed for Severe Pain.  Dispense: 30 Tab; Refill: 0  - " oxycodone immediate-release (ROXICODONE) 5 MG Tab; Take 1 Tab by mouth 1 time daily as needed for Severe Pain.  Dispense: 30 Tab; Refill: 0    2. Chronic use of opiate drugs therapeutic purposes  Please see above in HPI , for filled in Opioid risk calculator .      Followup: Return in about 3 months (around 7/17/2017).      Signed by: Luann Finney M.D.

## 2017-07-26 ENCOUNTER — OFFICE VISIT (OUTPATIENT)
Dept: INTERNAL MEDICINE | Facility: MEDICAL CENTER | Age: 47
End: 2017-07-26
Payer: COMMERCIAL

## 2017-07-26 VITALS
DIASTOLIC BLOOD PRESSURE: 78 MMHG | BODY MASS INDEX: 25.61 KG/M2 | SYSTOLIC BLOOD PRESSURE: 106 MMHG | RESPIRATION RATE: 16 BRPM | HEART RATE: 68 BPM | TEMPERATURE: 98 F | HEIGHT: 75 IN | OXYGEN SATURATION: 96 % | WEIGHT: 206 LBS

## 2017-07-26 DIAGNOSIS — Z79.891 ENCOUNTER FOR LONG-TERM OPIATE ANALGESIC USE: ICD-10-CM

## 2017-07-26 DIAGNOSIS — G89.29 CHRONIC MIDLINE LOW BACK PAIN WITHOUT SCIATICA: ICD-10-CM

## 2017-07-26 DIAGNOSIS — M54.50 CHRONIC MIDLINE LOW BACK PAIN WITHOUT SCIATICA: ICD-10-CM

## 2017-07-26 DIAGNOSIS — M54.17 LUMBOSACRAL RADICULOPATHY: ICD-10-CM

## 2017-07-26 DIAGNOSIS — M47.812 OSTEOARTHRITIS OF CERVICAL SPINE, UNSPECIFIED SPINAL OSTEOARTHRITIS COMPLICATION STATUS: ICD-10-CM

## 2017-07-26 PROCEDURE — 99214 OFFICE O/P EST MOD 30 MIN: CPT | Mod: GC | Performed by: INTERNAL MEDICINE

## 2017-07-26 RX ORDER — OXYCODONE HYDROCHLORIDE 5 MG/1
5 TABLET ORAL
Qty: 30 TAB | Refills: 0 | Status: SHIPPED | OUTPATIENT
Start: 2017-07-26 | End: 2017-10-04 | Stop reason: SDUPTHER

## 2017-07-26 RX ORDER — OXYCODONE HYDROCHLORIDE 5 MG/1
5 TABLET ORAL
Qty: 30 TAB | Refills: 0 | Status: SHIPPED | OUTPATIENT
Start: 2017-09-26 | End: 2017-10-04 | Stop reason: SDUPTHER

## 2017-07-26 RX ORDER — OXYCODONE HYDROCHLORIDE 5 MG/1
5 TABLET ORAL
Qty: 30 TAB | Refills: 0 | Status: SHIPPED | OUTPATIENT
Start: 2017-07-26 | End: 2017-07-26 | Stop reason: SDUPTHER

## 2017-07-26 RX ORDER — OXYCODONE HYDROCHLORIDE 5 MG/1
5 TABLET ORAL
Qty: 30 TAB | Refills: 0 | Status: SHIPPED | OUTPATIENT
Start: 2017-08-26 | End: 2017-10-04 | Stop reason: SDUPTHER

## 2017-07-26 RX ORDER — GABAPENTIN 300 MG/1
300 CAPSULE ORAL DAILY
Qty: 90 CAP | Refills: 1 | Status: SHIPPED | OUTPATIENT
Start: 2017-07-26 | End: 2018-04-25

## 2017-07-26 NOTE — PROGRESS NOTES
Established Patient    Gabriele presents today with the following:    CC: F/U Visit for chronic low back pain    HPI:   46 Y M with PMH of Chronic Lower back pain after accidental fall with the elevator, Left lower extremity numbness and Neuropathic pain after tendon rupture.     Lower back pain  C/O pain in the lower back during his ADL's and takes regularly 1 or 1/2 tab of Oxycodone every night which gives him relief. Regularly gets refills from our clinic. Requests for postdated refills of prescriptions as he is coming only for refill of medication every month. We already checked his UDS and signed pain contract which is only the Roxicodone we are prescribing.      Chronic pain recheck   Overall impression that this patient is benefiting from opioid therapy and that the benefits outweigh the risks of continued use: yes     - Controlled Substance Use Agreement current or updated today   - Urine drug screen current or ordered today   - Additional lab: CMP to assess liver and renal function-Done 10/2016 WNL   - Rx refill prescriptions are done for 3 months, No early refills. See orders   - Referral to pain management: no        Patient Active Problem List    Diagnosis Date Noted   • Chronic use of opiate drugs therapeutic purposes 03/10/2017   • Cervical spondylosis 12/29/2016   • Encounter for smoking cessation counseling 12/29/2016   • Preventative health care 12/29/2016   • Encounter for long-term opiate analgesic use 12/29/2016   • Radiculopathy 05/10/2016   • Cerebellar infarct (HCC) 04/06/2013   • Vertigo 04/05/2013       Current Outpatient Prescriptions   Medication Sig Dispense Refill   • [START ON 8/26/2017] oxycodone immediate-release (ROXICODONE) 5 MG Tab Take 1 Tab by mouth 1 time daily as needed for Severe Pain. 30 Tab 0   • [START ON 9/26/2017] oxycodone immediate-release (ROXICODONE) 5 MG Tab Take 1 Tab by mouth 1 time daily as needed for Severe Pain. 30 Tab 0   • oxycodone immediate-release  "(ROXICODONE) 5 MG Tab Take 1 Tab by mouth 1 time daily as needed for Severe Pain. 30 Tab 0   • gabapentin (NEURONTIN) 300 MG Cap Take 1 Cap by mouth every day. 90 Cap 1   • MILI ROOT PO Take  by mouth.     • Krill Oil 300 MG Cap Take  by mouth.     • Tadalafil 2.5 MG Tab Take 2.5 Tabs by mouth 1 time daily as needed. 30 Tab 4   • therapeutic multivitamin-minerals (THERAGRAN-M) Tab Take 1 Tab by mouth every day.     • Omega-3 Fatty Acids (FISH OIL) 1000 MG Cap capsule Take 1,000 mg by mouth 3 times a day, with meals.     • oxycodone immediate-release (ROXICODONE) 5 MG Tab Take 1 Tab by mouth 1 time daily as needed for Severe Pain. 30 Tab 0     No current facility-administered medications for this visit.       ROS: As per HPI. Additional pertinent symptoms as noted below.  Constitutional: Denies fevers or chills  Eyes: Denies changes in vision  Ears/Nose/Throat/Mouth: Denies nasal congestion or sore throat   Cardiovascular: Denies chest pain or palpitations   Respiratory: Denies shortness of breath , Denies cough  Gastrointestinal/Hepatic: Denies abd pain, nausea, vomiting   Genitourinary: Denies dysuria or frequency  Musculoskeletal/Rheum: HPI  Neurological: Denies headache  Psychiatric: Denies mood disorder   Endocrine: Denies hx of diabetes or thyroid dysfunction  Heme/Oncology/Lymph Nodes: Denies weight changes or enlarged LNs.   Allergic/Immunologic: Denies hx of allergies       /78 mmHg  Pulse 68  Temp(Src) 36.7 °C (98 °F)  Resp 16  Ht 1.905 m (6' 3\")  Wt 93.441 kg (206 lb)  BMI 25.75 kg/m2  SpO2 96%    Physical Exam   Constitutional:  oriented to person, place, and time. No distress.   Eyes: Pupils are equal, round, and reactive to light. No scleral icterus.  Neck: Neck supple. No thyromegaly present.   Cardiovascular: Normal rate, regular rhythm and normal heart sounds.  Exam reveals no gallop and no friction rub.  No murmur heard.  Pulmonary/Chest: Breath sounds normal. Chest wall is not dull to " percussion.   Musculoskeletal:   no edema.   Lymphadenopathy: no cervical adenopathy  Neurological: alert and oriented to person, place, and time.   Skin: No cyanosis. Nails show no clubbing.      Note: I have reviewed all pertinent labs and diagnostic tests associated with this visit with specific comments listed under the assessment and plan below    Assessment and Plan    1. Chronic midline low back pain without sciatica       Lumbosacral radiculopathy       Osteoarthritis of cervical spine, unspecified spinal osteoarthritis complication status  - Pt was previously referred to Pain management for these narcotic refilla , but as its a very small dose , he was referred back to us.  - As requested by the pt we will give him postdated prescriptions enough for 3 months to be refilled only on the specified START date.  - No more refills of Roxicodone till 10/26/2017.  ORDERS  - oxycodone immediate-release (ROXICODONE) 5 MG Tab; Take 1 Tab by mouth 1 time daily as needed for Severe Pain.  Dispense: 30 Tab; Refill: 0  - oxycodone immediate-release (ROXICODONE) 5 MG Tab; Take 1 Tab by mouth 1 time daily as needed for Severe Pain.  Dispense: 30 Tab; Refill: 0  - oxycodone immediate-release (ROXICODONE) 5 MG Tab; Take 1 Tab by mouth 1 time daily as needed for Severe Pain.  Dispense: 30 Tab; Refill: 0  - gabapentin (NEURONTIN) 300 MG Cap; Take 1 Cap by mouth every day.  Dispense: 90 Cap; Refill: 1    2. Encounter for long-term opiate analgesic use  - Chronic pain recheck form filled as above in HPI        Followup: Return in about 3 months (around 10/26/2017).      Signed by: Luann Finney M.D.

## 2017-07-26 NOTE — MR AVS SNAPSHOT
"        Gabriele Bland   2017 1:15 PM   Office Visit   MRN: [id number]    Department:  [facility] - Internal Med   Dept Phone:  [phone]    Description:  Male : 1970   Provider:  Luann Finney M.D.           Reason for Visit     Medication Refill FV 3 months      Allergies as of 2017     Allergen Noted Reactions    Food 2013       Tree nuts    Ibuprofen 2015       Naproxen 2015       Pcn [Penicillins] 2013       Tylenol 2015         You were diagnosed with     Chronic midline low back pain without sciatica   [2075049]       Encounter for long-term opiate analgesic use   [584889]       Lumbosacral radiculopathy   [683331]       Osteoarthritis of cervical spine, unspecified spinal osteoarthritis complication status   [0870883]         Vital Signs     Blood Pressure Pulse Temperature Respirations Height Weight    106/78 mmHg 68 36.7 °C (98 °F) 16 1.905 m (6' 3\") 93.441 kg (206 lb)    Body Mass Index Oxygen Saturation Smoking Status             25.75 kg/m2 96% Current Every Day Smoker         Basic Information     Date Of Birth Sex Race Ethnicity Preferred Language    1970 Male White Non- English      Your appointments     2017  1:15 PM   Established Patient with Luann Finney M.D.   [facility] - Internal Medicine (--)    [address] [place] [address] [id number] [phone]           You will be receiving a confirmation call a few days before your appointment from our automated call confirmation system.              Problem List              ICD-10-CM Priority Class Noted - Resolved    Vertigo R42   2013 - Present    Cerebellar infarct (HCC) I63.9   2013 - Present    Radiculopathy M54.10   5/10/2016 - Present    Cervical spondylosis M47.812   2016 - Present    Encounter for smoking cessation counseling Z71.6, Z72.0   2016 - Present    Preventative health care Z00.00   2016 - Present   " Encounter for long-term opiate analgesic use Z79.891   12/29/2016 - Present    Chronic use of opiate drugs therapeutic purposes Z79.891   3/10/2017 - Present      Health Maintenance        Date Due Completion Dates    IMM PNEUMOCOCCAL 19-64 (ADULT) MEDIUM RISK SERIES (1 of 1 - PPSV23) 12/28/1988 ---    IMM INFLUENZA (1) 9/1/2017 ---    IMM DTaP/Tdap/Td Vaccine (2 - Td) 6/15/2024 6/15/2014            Current Immunizations     Tdap Vaccine 6/15/2014      Below and/or attached are the medications your provider expects you to take. Review all of your home medications and newly ordered medications with your provider and/or pharmacist. Follow medication instructions as directed by your provider and/or pharmacist. Please keep your medication list with you and share with your provider. Update the information when medications are discontinued, doses are changed, or new medications (including over-the-counter products) are added; and carry medication information at all times in the event of emergency situations     Allergies:  FOOD - (reactions not documented)     IBUPROFEN - (reactions not documented)     NAPROXEN - (reactions not documented)     PCN - (reactions not documented)     TYLENOL - (reactions not documented)               Medications  Valid as of: July 26, 2017 -  1:35 PM    Generic Name Brand Name Tablet Size Instructions for use    Gabapentin (Cap) NEURONTIN 300 MG TAKE ONE CAPSULE BY MOUTH AT BEDTIME        Krill Oil (Cap) Krill Oil 300 MG Take  by mouth.        Glo Root   Take  by mouth.        Multiple Vitamins-Minerals (Tab) THERAGRAN-M  Take 1 Tab by mouth every day.        Omega-3 Fatty Acids (Cap) fish oil 1000 MG Take 1,000 mg by mouth 3 times a day, with meals.        OxyCODONE HCl (Tab) ROXICODONE 5 MG Take 1 Tab by mouth 1 time daily as needed for Severe Pain.        OxyCODONE HCl (Tab) ROXICODONE 5 MG Take 1 Tab by mouth 1 time daily as needed for Severe Pain.        OxyCODONE HCl (Tab) ROXICODONE 5  MG Take 1 Tab by mouth 1 time daily as needed for Severe Pain.        Tadalafil (Tab) Tadalafil 2.5 MG Take 2.5 Tabs by mouth 1 time daily as needed.        .                 Medicines prescribed today were sent to:     [facility][id number] - [place] - [address] [place] [address]    Phone: [phone] Fax: [phone]    Open 24 Hours?: No      Medication refill instructions:       If your prescription bottle indicates you have medication refills left, it is not necessary to call your provider’s office. Please contact your pharmacy and they will refill your medication.    If your prescription bottle indicates you do not have any refills left, you may request refills at any time through one of the following ways: The online [employer] system (except Urgent Care), by calling your provider’s office, or by asking your pharmacy to contact your provider’s office with a refill request. Medication refills are processed only during regular business hours and may not be available until the next business day. Your provider may request additional information or to have a follow-up visit with you prior to refilling your medication.   *Please Note: Medication refills are assigned a new Rx number when refilled electronically. Your pharmacy may indicate that no refills were authorized even though a new prescription for the same medication is available at the pharmacy. Please request the medicine by name with the pharmacy before contacting your provider for a refill.        Instructions    Back Pain, Adult  Back pain is very common. The pain often gets better over time. The cause of back pain is usually not dangerous. Most people can learn to manage their back pain on their own.   HOME CARE   Watch your back pain for any changes. The following actions may help to lessen any pain you are feeling:  · Stay active. Start with short walks on flat ground if you can. Try to walk farther each day.  · Exercise regularly as told  by your doctor. Exercise helps your back heal faster. It also helps avoid future injury by keeping your muscles strong and flexible.  · Do not sit, drive, or  one place for more than 30 minutes.  · Do not stay in bed. Resting more than 1-2 days can slow down your recovery.  · Be careful when you bend or lift an object. Use good form when lifting:  ¨ Bend at your knees.  ¨ Keep the object close to your body.  ¨ Do not twist.  · Sleep on a firm mattress. Lie on your side, and bend your knees. If you lie on your back, put a pillow under your knees.  · Take medicines only as told by your doctor.  · Put ice on the injured area.  ¨ Put ice in a plastic bag.  ¨ Place a towel between your skin and the bag.  ¨ Leave the ice on for 20 minutes, 2-3 times a day for the first 2-3 days. After that, you can switch between ice and heat packs.  · Avoid feeling anxious or stressed. Find good ways to deal with stress, such as exercise.  · Maintain a healthy weight. Extra weight puts stress on your back.  GET HELP IF:   · You have pain that does not go away with rest or medicine.  · You have worsening pain that goes down into your legs or buttocks.  · You have pain that does not get better in one week.  · You have pain at night.  · You lose weight.  · You have a fever or chills.  GET HELP RIGHT AWAY IF:   · You cannot control when you poop (bowel movement) or pee (urinate).  · Your arms or legs feel weak.  · Your arms or legs lose feeling (numbness).  · You feel sick to your stomach (nauseous) or throw up (vomit).  · You have belly (abdominal) pain.  · You feel like you may pass out (faint).     This information is not intended to replace advice given to you by your health care provider. Make sure you discuss any questions you have with your health care provider.     Document Released: 06/05/2009 Document Revised: 01/08/2016 Document Reviewed: 04/21/2015  Elsevier Interactive Patient Education ©2016 Elsevier  Inc.  ---------------------------------------------  1. Refilled the pain med Roxicodone.  2. F/U visit after 10/26/17.            Southern [employer] Access Code: [email or web address]  Expires: 8/25/2017  1:35 PM    Southern Air  A secure, online tool to manage your health information     [employer] Southern Air® is a secure, online tool that connects you to your personalized health information from the privacy of your home -- day or night - making it very easy for you to manage your healthcare. Once the activation process is completed, you can even access your medical information using the Southern Air steve, which is available for free in the Apple Steve store or Google Play store.     Southern Air provides the following levels of access (as shown below):   My Chart Features   [facility] Primary Care Doctor [facility]  Specialists [facility]  Urgent  Care Non-[facility]  Primary Care  Doctor   Email your healthcare team securely and privately 24/7 X X X    Manage appointments: schedule your next appointment; view details of past/upcoming appointments X      Request prescription refills. X      View recent personal medical records, including lab and immunizations X X X X   View health record, including health history, allergies, medications X X X X   Read reports about your outpatient visits, procedures, consult and ER notes X X X X   See your discharge summary, which is a recap of your hospital and/or ER visit that includes your diagnosis, lab results, and care plan. X X       How to register for Southern Air:  1. Go to  [email or web address].  2. Click on the Sign Up Now box, which takes you to the New Member Sign Up page. You will need to provide the following information:  a. Enter your Southern Air Access Code exactly as it appears at the top of this page. (You will not need to use this code after you’ve completed the sign-up process. If you do not sign up before the expiration date, you must request a new code.)   b. Enter your date of birth.   c. Enter your  home email address.   d. Click Submit, and follow the next screen’s instructions.  3. Create a MetaCure ID. This will be your MetaCure login ID and cannot be changed, so think of one that is secure and easy to remember.  4. Create a MetaCure password. You can change your password at any time.  5. Enter your Password Reset Question and Answer. This can be used at a later time if you forget your password.   6. Enter your e-mail address. This allows you to receive e-mail notifications when new information is available in MetaCure.  7. Click Sign Up. You can now view your health information.    For assistance activating your MetaCure account, call [phone]        Quit Tobacco Information     Do you want to quit using tobacco?    Quitting tobacco decreases risks of cancer, heart and lung disease, increases life expectancy, improves sense of taste and smell, and increases spending money, among other benefits.    If you are thinking about quitting, we can help.  • [facility] Quit Tobacco Program: [phone]  o Program occurs weekly for four weeks and includes pharmacist consultation on products to support quitting smoking or chewing tobacco. A provider referral is needed for pharmacist consultation.  • Tobacco Users Help Hotline: 1-741-QUITNOW (599-4572) or https://[place].quitlogix.org/  o Free, confidential telephone and online coaching for [place] residents. Sessions are designed on a schedule that is convenient for you. Eligible clients receive free nicotine replacement therapy.  • Nationally: www.smokefree.gov  o Information and professional assistance to support both immediate and long-term needs as you become, and remain, a non-smoker. Smokefree.gov allows you to choose the help that best fits your needs.

## 2017-07-26 NOTE — PATIENT INSTRUCTIONS
Back Pain, Adult  Back pain is very common. The pain often gets better over time. The cause of back pain is usually not dangerous. Most people can learn to manage their back pain on their own.   HOME CARE   Watch your back pain for any changes. The following actions may help to lessen any pain you are feeling:  · Stay active. Start with short walks on flat ground if you can. Try to walk farther each day.  · Exercise regularly as told by your doctor. Exercise helps your back heal faster. It also helps avoid future injury by keeping your muscles strong and flexible.  · Do not sit, drive, or  one place for more than 30 minutes.  · Do not stay in bed. Resting more than 1-2 days can slow down your recovery.  · Be careful when you bend or lift an object. Use good form when lifting:  ¨ Bend at your knees.  ¨ Keep the object close to your body.  ¨ Do not twist.  · Sleep on a firm mattress. Lie on your side, and bend your knees. If you lie on your back, put a pillow under your knees.  · Take medicines only as told by your doctor.  · Put ice on the injured area.  ¨ Put ice in a plastic bag.  ¨ Place a towel between your skin and the bag.  ¨ Leave the ice on for 20 minutes, 2-3 times a day for the first 2-3 days. After that, you can switch between ice and heat packs.  · Avoid feeling anxious or stressed. Find good ways to deal with stress, such as exercise.  · Maintain a healthy weight. Extra weight puts stress on your back.  GET HELP IF:   · You have pain that does not go away with rest or medicine.  · You have worsening pain that goes down into your legs or buttocks.  · You have pain that does not get better in one week.  · You have pain at night.  · You lose weight.  · You have a fever or chills.  GET HELP RIGHT AWAY IF:   · You cannot control when you poop (bowel movement) or pee (urinate).  · Your arms or legs feel weak.  · Your arms or legs lose feeling (numbness).  · You feel sick to your stomach (nauseous) or  throw up (vomit).  · You have belly (abdominal) pain.  · You feel like you may pass out (faint).     This information is not intended to replace advice given to you by your health care provider. Make sure you discuss any questions you have with your health care provider.     Document Released: 06/05/2009 Document Revised: 01/08/2016 Document Reviewed: 04/21/2015  [employer] Interactive Patient Education ©2016 Elsevier Inc.  ---------------------------------------------  1. Refilled the pain med Roxicodone.  2. F/U visit after 10/26/17.

## 2017-07-26 NOTE — Clinical Note
Controlled Substance Treatment Agreement  [facility]    I, Gabriele Bland, understand and agree that safe prescribing practices of controlled medications are critical to my treatment at [facility].  I understand that this agreement is vital to the mutual trust and confidence necessary in a provider/patient relationship.    By signing this document I have agreed to all of the following rules listed below (initial each):    ____ I will attend all of my scheduled appointments with my prescribing physician/nurse practitioner.                     Refills will only be processed in person at appointments.    ____ I will discuss any concerns or questions I have regarding my medications or my treatment plan with  my prescribing physician/nurse practitioner.    ____ I will take my medications as prescribed and will not change the way I take them until I have  discussed it with my prescribing physician/nurse practitioner.    ____ I will tell my physician/nurse practitioner of all other medications I currently take including other  controlled medications.    ____ I will sign a release of information form to let my physician/nurse practitioner contact any other  health care providers involved in my care.    ____ I will actively participate in developing a treatment plan with my physician/nurse practitioner, which  may include referrals for individual or group psychotherapy, psychological testing, and/or  alcohol/drug rehabilitation programs.    ____ I will keep my medication in a safe and secure location and out of the reach of children.    ____ I will not sell, trade, or share my medication with others.    ____ I understand that no early refills will be authorized in the event of lost or stolen prescriptions of                     controlled medications.    ____ I will not use illegal/illicit drugs or abuse alcohol or take another person's prescriptions.    ____ I will submit to urine or blood tests if requested by  my physician/nurse practitioner to determine  treatment compliance and/or to screen for illicit drugs.  I understand that this drug screen may  result in a charge to me that is not covered by my insurance.    ____ WOMEN age 55 years or younger:  I will take reasonable and appropriate steps to ensure that I do  not become pregnant while taking medications.  If I become pregnant I will inform my  physician/nurse practitioner immediately.                  I understand my prescribing physician/nurse practitioner may stop prescribing the medication if:     1.  I develop significant side effects.     2.  I continue to use legal mood altering substances like alcohol, marijuana, or stimulant beverages       even after my prescriber has advised me ongoing use interfered with my progress and/or safety       of my prescription medication.     3.  My urine drug screen is inconsistent with my prescribed medication or if positive for illicit drugs.     4.  My behavior is inconsistent with the responsibilities outlined above, or is deemed abusive to my       prescriber or clinic staff.     5.  I have not attended an appointment with my prescriber in the past 6 months or in the timeframe       agreed upon in my treatment plan.          I have received a copy of the controlled substance information sheet.  I have reviewed this and understand the risks.      _________________________________    __________________________      Signature                 Date        ____ [facility] Patient    Patient Initial                                        CONTROLLED SUBSTANCES INFORMATION SHEET    Treatment with these medications is controversial and can lead to addiction or relapse of addiction behavior. Drugs that are legal, such as a prescription and over-the-counter medications, can be dangerous. Taking controlled substances may lead to serious health problems, over-dose and even death. Potential side effects and risks of controlled  substances include, but are not limited to:     POTENTIAL SIDE EFFECTS  • Drowsiness or insomnia--this increases your risk of falls and accidents.  • Confusion, difficulty thinking, dizziness  • Difficulty breathing, shortness of breath, wheezing  • Nausea, vomiting, or constipation  • Rash, itching  • Mood swings, irritability, depression, anxiety  • Worsening pain  POTENTIAL RISKS  • Allergic reaction  • Interaction with other medications (increasing effects or side effects of drugs taken together)  • Need for higher doses to achieve the same effect may occur with long term use  • Drug dependence (withdrawal symptoms if medication is stopped abruptly)  • Addiction (compulsive drug use not related to pain relief)  • Impaired judgment and/or slowing of reflexes (driving or operating machinery may be hazardous due to effects on the brain and nerves)  Osteoporosis and other adverse effects on your metabolic or endocrine systemsOTHER    • Treatment is contingent on evidence of benefit such as improvement in pain or function. If there is no benefit the medication will be tapered and discontinued  • These medications are for your use only. Do not share your medications with others and do not sell your medication.   • You may be asked for a blood or urine sample at your appointment. Please be prepared to provide a specimen. You are responsible for all costs associated with these labs.  • Ensure safe storage of your medications in their original containers and out of the reach of others.  • Early refills will not be approved.  • Lost or stolen prescriptions will not be replaced.  • Inform your provider if you are taking any other medications, including over the counter medications and supplements.  • Original prescriptions must be presented to pharmacy within 72 hrs.   • If multiple prescriptions are given at one visit, all prescriptions need to be presented to the pharmacy at the same time.   • Unintentional overdose can  occur especially when medication is taken at higher doses. Upon request, your provider can prescribe a medication which can reverse the effects of an overdose when administered in time.  • Proper disposal of unused medications reduces accidental exposure or intentional misuse.  o Take unused medications to a Drug Enforcement Administration ([place]ORALES) public disposal location (https://apps.deadiversion.[employer].gov/pubdispsearch/)  o If services unavailable, throw the drugs in the household trash following these steps:  - Mix medication with used coffee grounds or mateo litter, seal in bag and place in trash.    If you have any questions regarding your medications please talk with Luann Finney M.D. or another member of your care team.

## 2017-09-16 NOTE — PATIENT INSTRUCTIONS
Chronic Back Pain   When back pain lasts longer than 3 months, it is called chronic back pain. People with chronic back pain often go through certain periods that are more intense (flare-ups).   CAUSES  Chronic back pain can be caused by wear and tear (degeneration) on different structures in your back. These structures include:  · The bones of your spine (vertebrae) and the joints surrounding your spinal cord and nerve roots (facets).  · The strong, fibrous tissues that connect your vertebrae (ligaments).  Degeneration of these structures may result in pressure on your nerves. This can lead to constant pain.  HOME CARE INSTRUCTIONS  · Avoid bending, heavy lifting, prolonged sitting, and activities which make the problem worse.  · Take brief periods of rest throughout the day to reduce your pain. Lying down or standing usually is better than sitting while you are resting.  · Take over-the-counter or prescription medicines only as directed by your caregiver.  SEEK IMMEDIATE MEDICAL CARE IF:   · You have weakness or numbness in one of your legs or feet.  · You have trouble controlling your bladder or bowels.  · You have nausea, vomiting, abdominal pain, shortness of breath, or fainting.     This information is not intended to replace advice given to you by your health care provider. Make sure you discuss any questions you have with your health care provider.     Document Released: 01/25/2006 Document Revised: 03/11/2013 Document Reviewed: 12/01/2012  [employer] Interactive Patient Education ©2016 Elsevier Inc.  ----------------------------------------------------------  1. Refilled till 07/16 /2017.  2. Next appointment after 07/16/2017   no/socially

## 2017-10-04 ENCOUNTER — OFFICE VISIT (OUTPATIENT)
Dept: INTERNAL MEDICINE | Facility: MEDICAL CENTER | Age: 47
End: 2017-10-04
Payer: COMMERCIAL

## 2017-10-04 VITALS
WEIGHT: 207.5 LBS | HEIGHT: 75 IN | HEART RATE: 67 BPM | SYSTOLIC BLOOD PRESSURE: 132 MMHG | OXYGEN SATURATION: 99 % | DIASTOLIC BLOOD PRESSURE: 96 MMHG | BODY MASS INDEX: 25.8 KG/M2 | TEMPERATURE: 98.3 F

## 2017-10-04 DIAGNOSIS — M54.50 CHRONIC MIDLINE LOW BACK PAIN WITHOUT SCIATICA: ICD-10-CM

## 2017-10-04 DIAGNOSIS — Z13.220 SCREENING, LIPID: ICD-10-CM

## 2017-10-04 DIAGNOSIS — G89.29 CHRONIC MIDLINE LOW BACK PAIN WITHOUT SCIATICA: ICD-10-CM

## 2017-10-04 DIAGNOSIS — Z79.891 CHRONIC USE OF OPIATE FOR THERAPEUTIC PURPOSE: ICD-10-CM

## 2017-10-04 DIAGNOSIS — N52.9 ERECTILE DYSFUNCTION, UNSPECIFIED ERECTILE DYSFUNCTION TYPE: ICD-10-CM

## 2017-10-04 PROCEDURE — 99000 SPECIMEN HANDLING OFFICE-LAB: CPT | Performed by: INTERNAL MEDICINE

## 2017-10-04 PROCEDURE — 99214 OFFICE O/P EST MOD 30 MIN: CPT | Mod: GC | Performed by: INTERNAL MEDICINE

## 2017-10-04 RX ORDER — TADALAFIL 2.5 MG/1
5 TABLET ORAL
Qty: 30 TAB | Refills: 2 | Status: SHIPPED | OUTPATIENT
Start: 2017-10-04 | End: 2019-01-07 | Stop reason: SDUPTHER

## 2017-10-04 RX ORDER — OXYCODONE HYDROCHLORIDE 5 MG/1
5 TABLET ORAL
Qty: 30 TAB | Refills: 0 | Status: SHIPPED | OUTPATIENT
Start: 2017-11-26 | End: 2018-01-10

## 2017-10-04 RX ORDER — OXYCODONE HYDROCHLORIDE 5 MG/1
5 TABLET ORAL
Qty: 30 TAB | Refills: 0 | Status: SHIPPED | OUTPATIENT
Start: 2017-10-26 | End: 2018-01-10

## 2017-10-04 RX ORDER — OXYCODONE HYDROCHLORIDE 5 MG/1
5 TABLET ORAL
Qty: 30 TAB | Refills: 0 | Status: SHIPPED | OUTPATIENT
Start: 2017-12-26 | End: 2018-01-10 | Stop reason: SDUPTHER

## 2017-10-04 NOTE — PROGRESS NOTES
Established Patient    Gabriele presents today with the following:    CC: F/U for refill of pain meds  Back pain    HPI:   46 Y M with PMH of Chronic Lower back pain after accidental fall with the elevator, Left lower extremity numbness and Neuropathic pain after tendon rupture.      Lower back pain  C/O pain in the lower back during his ADL's and takes regularly 1 or 1/2 tab of Oxycodone every night which gives him relief. Regularly gets refills from our clinic. Requests for postdated refills of prescriptions as he is coming only for refill of medication every month. We will do recheck of his UDS as alerted on his chart.      Chronic pain recheck   Overall impression that this patient is benefiting from opioid therapy and that the benefits outweigh the risks of continued use: yes     - Controlled Substance Use Agreement current or updated today : Yes   - Urine drug screen current or ordered today : Yes   - Additional lab: CMP to assess liver and renal function : Yes   - Rx refill prescriptions are done for 3 months, No early refills. See orders   - Referral to pain management: NO                ( It was done in the past but he was referred back to PCP as his Oxycodone dose                 was very low once a day.)    Patient Active Problem List    Diagnosis Date Noted   • Chronic use of opiate drugs therapeutic purposes 03/10/2017   • Cervical spondylosis 12/29/2016   • Encounter for smoking cessation counseling 12/29/2016   • Preventative health care 12/29/2016   • Encounter for long-term opiate analgesic use 12/29/2016   • Radiculopathy 05/10/2016   • Cerebellar infarct (HCC) 04/06/2013   • Vertigo 04/05/2013       Current Outpatient Prescriptions   Medication Sig Dispense Refill   • gabapentin (NEURONTIN) 300 MG Cap Take 1 Cap by mouth every day. 90 Cap 1   • MILI ROOT PO Take  by mouth.     • oxycodone immediate-release (ROXICODONE) 5 MG Tab Take 1 Tab by mouth 1 time daily as needed for Severe Pain. 30 Tab  "0   • Krill Oil 300 MG Cap Take  by mouth.     • Tadalafil 2.5 MG Tab Take 2.5 Tabs by mouth 1 time daily as needed. 30 Tab 4   • therapeutic multivitamin-minerals (THERAGRAN-M) Tab Take 1 Tab by mouth every day.     • Omega-3 Fatty Acids (FISH OIL) 1000 MG Cap capsule Take 1,000 mg by mouth 3 times a day, with meals.     • oxycodone immediate-release (ROXICODONE) 5 MG Tab Take 1 Tab by mouth 1 time daily as needed for Severe Pain. 30 Tab 0   • oxycodone immediate-release (ROXICODONE) 5 MG Tab Take 1 Tab by mouth 1 time daily as needed for Severe Pain. 30 Tab 0   • oxycodone immediate-release (ROXICODONE) 5 MG Tab Take 1 Tab by mouth 1 time daily as needed for Severe Pain. 30 Tab 0     No current facility-administered medications for this visit.        ROS: As per HPI. Additional pertinent symptoms as noted below.  Constitutional: Denies fevers or chills  Eyes: Denies changes in vision  Ears/Nose/Throat/Mouth: Denies nasal congestion or sore throat   Cardiovascular: Denies chest pain or palpitations   Respiratory: Denies shortness of breath , Denies cough  Gastrointestinal/Hepatic: Denies abd pain, nausea, vomiting   Genitourinary: Denies dysuria or frequency  Musculoskeletal/Rheum: HPI  Neurological: Denies headache  Psychiatric: Denies mood disorder   Endocrine: Denies hx of diabetes or thyroid dysfunction  Heme/Oncology/Lymph Nodes: Denies weight changes or enlarged LNs.   Allergic/Immunologic: Denies hx of allergies       /96   Pulse 67   Temp 36.8 °C (98.3 °F)   Ht 1.905 m (6' 3\")   Wt 94.1 kg (207 lb 8 oz)   SpO2 99%   BMI 25.94 kg/m²     Physical Exam   Constitutional:  oriented to person, place, and time. No distress.   Eyes: Pupils are equal, round, and reactive to light. No scleral icterus.  Neck: Neck supple. No thyromegaly present.   Cardiovascular: Normal rate, regular rhythm and normal heart sounds.  Exam reveals no gallop and no friction rub.  No murmur heard.  Pulmonary/Chest: Breath " sounds normal. Chest wall is not dull to percussion.   Musculoskeletal:   no edema.   Lymphadenopathy: no cervical adenopathy  Neurological: alert and oriented to person, place, and time.   Skin: No cyanosis. Nails show no clubbing.  SLR : Negative for Sciatica bilaterally.  Back : Rt Lumbosacral paraspinal tenderness.    Note: I have reviewed all pertinent labs and diagnostic tests associated with this visit with specific comments listed under the assessment and plan below        Assessment and Plan    1. Right-sided low back pain without sciatica, unspecified chronicity      Encounter for long-term opiate analgesic use      Lumbosacral radiculopathy  - Pt is here for regular F/U visit and refill of meds  - Will renew his pain contract and also millenium UDS  - All his Labwork in 2016 were WNL.  - Will recheck his routine labs as per the Chronic Opioid use.  - Refilled his pain meds for 3 months with postdated refills of Oxycodone.   Pt good for meds till 01/26/2018.  ORDERS  - CBC WITH DIFFERENTIAL; Future  - COMP METABOLIC PANEL; Future  - LIPID PROFILE; Future  - TSH WITH REFLEX TO FT4; Future  - CONTROLLED SUBSTANCE TREATMENT AGREEMENT  - [facility] PAIN MANAGEMENT SCREEN; Future  - oxycodone immediate-release (ROXICODONE) 5 MG Tab; Take 1 Tab by mouth 1 time daily as needed for Severe Pain.  Dispense: 30 Tab; Refill: 0  - oxycodone immediate-release (ROXICODONE) 5 MG Tab; Take 1 Tab by mouth 1 time daily as needed for Severe Pain.  Dispense: 30 Tab; Refill: 0  - oxycodone immediate-release (ROXICODONE) 5 MG Tab; Take 1 Tab by mouth 1 time daily as needed for Severe Pain.  Dispense: 30 Tab; Refill: 0      2. Chronic use of opiate for therapeutic purpose  - CONTROLLED SUBSTANCE TREATMENT AGREEMENT  - [facility] PAIN MANAGEMENT SCREEN; Future        Followup: No Follow-up on file.      Signed by: Luann Finney M.D.

## 2017-10-04 NOTE — PATIENT INSTRUCTIONS
Back Pain, Adult  Back pain is very common in adults. The cause of back pain is rarely dangerous and the pain often gets better over time. The cause of your back pain may not be known. Some common causes of back pain include:  · Strain of the muscles or ligaments supporting the spine.  · Wear and tear (degeneration) of the spinal disks.  · Arthritis.  · Direct injury to the back.  For many people, back pain may return. Since back pain is rarely dangerous, most people can learn to manage this condition on their own.  HOME CARE INSTRUCTIONS  Watch your back pain for any changes. The following actions may help to lessen any discomfort you are feeling:  · Remain active. It is stressful on your back to sit or  one place for long periods of time. Do not sit, drive, or  one place for more than 30 minutes at a time. Take short walks on even surfaces as soon as you are able. Try to increase the length of time you walk each day.  · Exercise regularly as directed by your health care provider. Exercise helps your back heal faster. It also helps avoid future injury by keeping your muscles strong and flexible.  · Do not stay in bed. Resting more than 1-2 days can delay your recovery.  · Pay attention to your body when you bend and lift. The most comfortable positions are those that put less stress on your recovering back. Always use proper lifting techniques, including:  ¨ Bending your knees.  ¨ Keeping the load close to your body.  ¨ Avoiding twisting.  · Find a comfortable position to sleep. Use a firm mattress and lie on your side with your knees slightly bent. If you lie on your back, put a pillow under your knees.  · Avoid feeling anxious or stressed. Stress increases muscle tension and can worsen back pain. It is important to recognize when you are anxious or stressed and learn ways to manage it, such as with exercise.  · Take medicines only as directed by your health care provider. Over-the-counter  medicines to reduce pain and inflammation are often the most helpful. Your health care provider may prescribe muscle relaxant drugs. These medicines help dull your pain so you can more quickly return to your normal activities and healthy exercise.  · Apply ice to the injured area:  ¨ Put ice in a plastic bag.  ¨ Place a towel between your skin and the bag.  ¨ Leave the ice on for 20 minutes, 2-3 times a day for the first 2-3 days. After that, ice and heat may be alternated to reduce pain and spasms.  · Maintain a healthy weight. Excess weight puts extra stress on your back and makes it difficult to maintain good posture.  SEEK MEDICAL CARE IF:  · You have pain that is not relieved with rest or medicine.  · You have increasing pain going down into the legs or buttocks.  · You have pain that does not improve in one week.  · You have night pain.  · You lose weight.  · You have a fever or chills.  SEEK IMMEDIATE MEDICAL CARE IF:   · You develop new bowel or bladder control problems.  · You have unusual weakness or numbness in your arms or legs.  · You develop nausea or vomiting.  · You develop abdominal pain.  · You feel faint.     This information is not intended to replace advice given to you by your health care provider. Make sure you discuss any questions you have with your health care provider.     Document Released: 12/18/2006 Document Revised: 01/08/2016 Document Reviewed: 04/21/2015  [employer] Interactive Patient Education ©2016 [employer]  --------------------------------------------------------------------------------  1. Refilled with postdated prescriptions for 3 months till 01/16/2018.

## 2018-01-10 ENCOUNTER — OFFICE VISIT (OUTPATIENT)
Dept: INTERNAL MEDICINE | Facility: MEDICAL CENTER | Age: 48
End: 2018-01-10
Payer: COMMERCIAL

## 2018-01-10 VITALS
HEART RATE: 74 BPM | BODY MASS INDEX: 26.51 KG/M2 | WEIGHT: 213.2 LBS | HEIGHT: 75 IN | OXYGEN SATURATION: 98 % | DIASTOLIC BLOOD PRESSURE: 94 MMHG | SYSTOLIC BLOOD PRESSURE: 146 MMHG | TEMPERATURE: 97.2 F

## 2018-01-10 DIAGNOSIS — M54.17 LUMBOSACRAL RADICULOPATHY: ICD-10-CM

## 2018-01-10 DIAGNOSIS — M54.50 CHRONIC RIGHT-SIDED LOW BACK PAIN WITHOUT SCIATICA: ICD-10-CM

## 2018-01-10 DIAGNOSIS — M47.812 OSTEOARTHRITIS OF CERVICAL SPINE, UNSPECIFIED SPINAL OSTEOARTHRITIS COMPLICATION STATUS: ICD-10-CM

## 2018-01-10 DIAGNOSIS — G89.29 CHRONIC RIGHT-SIDED LOW BACK PAIN WITHOUT SCIATICA: ICD-10-CM

## 2018-01-10 DIAGNOSIS — M54.50 CHRONIC MIDLINE LOW BACK PAIN WITHOUT SCIATICA: ICD-10-CM

## 2018-01-10 DIAGNOSIS — Z79.891 CHRONIC USE OF OPIATE DRUGS THERAPEUTIC PURPOSES: ICD-10-CM

## 2018-01-10 DIAGNOSIS — G89.29 CHRONIC MIDLINE LOW BACK PAIN WITHOUT SCIATICA: ICD-10-CM

## 2018-01-10 DIAGNOSIS — Z79.891 CHRONIC USE OF OPIATE FOR THERAPEUTIC PURPOSE: ICD-10-CM

## 2018-01-10 PROCEDURE — 99214 OFFICE O/P EST MOD 30 MIN: CPT | Mod: GC | Performed by: INTERNAL MEDICINE

## 2018-01-10 RX ORDER — CELECOXIB 100 MG/1
100 CAPSULE ORAL DAILY
Qty: 90 CAP | Refills: 0 | Status: SHIPPED | OUTPATIENT
Start: 2018-01-10 | End: 2018-04-25

## 2018-01-10 RX ORDER — OXYCODONE HYDROCHLORIDE 5 MG/1
5 TABLET ORAL
Qty: 28 TAB | Refills: 0 | Status: SHIPPED | OUTPATIENT
Start: 2018-01-31 | End: 2018-02-28

## 2018-01-10 RX ORDER — OXYCODONE HYDROCHLORIDE 5 MG/1
5 TABLET ORAL
Qty: 30 TAB | Refills: 0 | Status: SHIPPED | OUTPATIENT
Start: 2018-03-31 | End: 2018-04-25 | Stop reason: SDUPTHER

## 2018-01-10 RX ORDER — OXYCODONE HYDROCHLORIDE 5 MG/1
5 TABLET ORAL
Qty: 31 TAB | Refills: 0 | Status: SHIPPED | OUTPATIENT
Start: 2018-02-28 | End: 2018-03-31

## 2018-01-10 ASSESSMENT — PAIN SCALES - GENERAL: PAINLEVEL: 5=MODERATE PAIN

## 2018-01-10 NOTE — PROGRESS NOTES
Established Patient    Gabriele presents today with the following:    CC: Lower back pain and medication refill    HPI: NINI is a 47-year-old male who presents to the clinic for medication refill due to his low lower back pain. Patient has a past medical history significant for cervical spondylosis and chronic lower back pain. BJ has a long history of cervical spondylosis characterized by an MRI that shows multilevel degenerative disc disease. On a previous visit, he was sent to a pain management specialist who told him that an epidural or trigger point injection were not indicated and that he needed to simply stay on his oxycodone that he takes roughly once a day for management of severe muscle spasm that occur on a unpredictable basis.     Patient has a prior history of small CVA documented by MRI involving the cerebellum in the posterior inferior cerebral artery thought to be due to trauma while performing sport activities he's had no recurrent strokelike or TIA-like symptomology and his neurological status has completely resolved. As part of his workup for his posterior cerebellar stroke in the past, he had a CT angiogram was entirely normal and then had an MRI of the cervical spine due to chronic neck pain that showed an incidental thyroid cyst.    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 He participates in various intramural sports, as well as participate as basketball and  for his kids. Patient reports no side effects from narcotic use, he denies any lethargy and her somnolence. Patient described that he currently uses 5 mg to 2.5 mg daily at bedtime for pain relief. Patient states that if he does not take the pain medication his back gives out and he is unable to walk or perform any of his ADLs.    Chronic pain recheck:   Last dose of controlled substance: Last night  Chronic pain treated with Oxycodone taken once a day  Current alcohol or substance  use: yes    Consequences of Chronic Opiate therapy:  (5 A's)  Analgesia:  not changed  Activity:  not changed  Adverse Events:  No   Aberrant Behaviors: no inappropriate refills requested, lost or stolen meds reported.   Affect/Mood: good grooming, full facial expressions, normal speech pattern and content, normal thought patterns, normal perception, good insight, normal reasoning    Nonnarcotic treatments that are being used: Physical therapy     Pain management agreement initiated/updated and signed on: 01/10/18  Urine drug screen done: 10/17, which was reviewed today and is consistent with prescribed medications.    I have reviewed the medical records, the Prescription Monitoring Program and I have determined that controlled substance treatment is medically indicated.    Patient Active Problem List    Diagnosis Date Noted   • Chronic lower back pain 01/10/2018   • Chronic use of opiate drugs therapeutic purposes 03/10/2017   • Cervical spondylosis 12/29/2016   • Encounter for smoking cessation counseling 12/29/2016   • Preventative health care 12/29/2016   • Radiculopathy 05/10/2016   • Cerebellar infarct (HCC) 04/06/2013   • Vertigo 04/05/2013       Current Outpatient Prescriptions   Medication Sig Dispense Refill   • Sildenafil Citrate (VIAGRA PO) Take  by mouth.     • oxycodone immediate-release (ROXICODONE) 5 MG Tab Take 1 Tab by mouth 1 time daily as needed for Severe Pain. 30 Tab 0   • gabapentin (NEURONTIN) 300 MG Cap Take 1 Cap by mouth every day. 90 Cap 1   • MILI ROOT PO Take  by mouth.     • Krill Oil 300 MG Cap Take  by mouth.     • therapeutic multivitamin-minerals (THERAGRAN-M) Tab Take 1 Tab by mouth every day.     • Omega-3 Fatty Acids (FISH OIL) 1000 MG Cap capsule Take 1,000 mg by mouth 3 times a day, with meals.     • Tadalafil 2.5 MG Tab Take 2 Tabs by mouth 1 time daily as needed. 30 Tab 2     No current facility-administered medications for this visit.        Social History     Social  "History   • Marital status:      Spouse name: N/A   • Number of children: N/A   • Years of education: N/A     Occupational History   • Not on file.     Social History Main Topics   • Smoking status: Current Every Day Smoker     Packs/day: 0.25     Years: 20.00     Types: Cigarettes   • Smokeless tobacco: Never Used      Comment: 4 cig/day   • Alcohol use 8.4 oz/week     14 Glasses of wine per week      Comment: glass wine/night   • Drug use: No   • Sexual activity: Yes     Other Topics Concern   • Not on file     Social History Narrative   • No narrative on file       Family History   Problem Relation Age of Onset   • Diabetes Father    • Psychiatry Mother        ROS: As per HPI. Additional pertinent symptoms as noted below.    A complete 14 system review of system was inquired of the patient is otherwise negative except as noted in the HPI    /94   Pulse 74   Temp 36.2 °C (97.2 °F)   Ht 1.905 m (6' 3\")   Wt 96.7 kg (213 lb 3.2 oz)   SpO2 98%   BMI 26.65 kg/m²     Physical Exam  General:  Alert and oriented, No apparent distress.    Eyes: Pupils equal and reactive. No scleral icterus.    Throat: Clear no erythema or exudates noted.    Neck: Supple. No lymphadenopathy noted. Thyroid not enlarged.    Lungs: Clear to auscultation and percussion bilaterally.    Cardiovascular: Regular rate and rhythm. No murmurs, rubs or gallops.    Abdomen:  Benign. No rebound or guarding noted.    Back: Pain elicited on the right side of patient's lower back on examination      Extremities: No clubbing, cyanosis, edema.    Skin: Clear. No rash or suspicious skin lesions noted.    Note: I have reviewed all pertinent labs and diagnostic tests associated with this visit with specific comments listed under the assessment and plan below      Assessment and Plan    1. Osteoarthritis of cervical spine, unspecified spinal osteoarthritis complication status  MRI performed in 2013 revealed a 15 x 9 mm probable cystic lesion " posterior to the right thyroid lobe. Multilevel degenerative disc disease with disc osteophyte changes at C4-5, C5-6, and C6-C7. Patient states that his pain is currently under control with his current dose of oxycodone.   Plan  -Patient was started on a trial dose of Celebrex 100 mg in the hope to reduce his oxycodone.    2. Chronic use of opiate drugs therapeutic purposes  Patient has a history of chronic lower back pain currently treated by 5 mg to 2.5 mg of oxycodone at night. Patient denies any side effects. Narcotic record check reveals the patient has been compliant. UDS screen done over 2017 revealed the patient has been compliant and is currently only taking oxycodone. Patient was advised to cease drinking alcohol with current opioid intake. Patient was advised to decrease the amount of his opioid use.  Plan  -Patient was prescribed 3 month supply of oxycodone, at 5 mg daily at bedtime.  -Patient was also started on trial dose of Celebrex, 100 mg. This was added in hopes to reduce patient's opioid amount/use.    3. Lumbosacral radiculopathy  Patient suffered an injury in his lower back while working at the [employer], patient fell down an elevator shaft. The MRI performed 2016 showed multilevel degenerative changes of the lumbar spine. Plan patient states that if he does not take his pain medication for his back will give out and he is unable to participate in his daily activities.  Plan  -Patient was prescribed 3 month supply of oxycodone, at 5 mg daily at bedtime.  -Patient was also started on trial dose of Celebrex, 100 mg. This was added in hopes to reduce patient's opioid amount/use    4. Chronic right-sided low back pain without sciatica  Patient suffered an injury in his lower back while working at the [employer], patient fell down an elevator shaft. The MRI performed 2016 showed multilevel degenerative changes of the lumbar spine. Plan patient states that if he does not take his pain medication for  his back will give out and he is unable to participate in his daily activities.  Plan  -Patient was prescribed 3 month supply of oxycodone, at 5 mg daily at bedtime.  -Patient was also started on trial dose of Celebrex, 100 mg. This was added in hopes to reduce patient's opioid amount/use      Followup: No Follow-up on file.      Signed by: Gokul Ferrara M.D.

## 2018-01-10 NOTE — PATIENT INSTRUCTIONS
Food Choices for Gastroesophageal Reflux Disease, Adult  When you have gastroesophageal reflux disease (GERD), the foods you eat and your eating habits are very important. Choosing the right foods can help ease the discomfort of GERD.  WHAT GENERAL GUIDELINES DO I NEED TO FOLLOW?  · Choose fruits, vegetables, whole grains, low-fat dairy products, and low-fat meat, fish, and poultry.  · Limit fats such as oils, salad dressings, butter, nuts, and avocado.  · Keep a food diary to identify foods that cause symptoms.  · Avoid foods that cause reflux. These may be different for different people.  · Eat frequent small meals instead of three large meals each day.  · Eat your meals slowly, in a relaxed setting.  · Limit fried foods.  · Cook foods using methods other than frying.  · Avoid drinking alcohol.  · Avoid drinking large amounts of liquids with your meals.  · Avoid bending over or lying down until 2-3 hours after eating.  WHAT FOODS ARE NOT RECOMMENDED?  The following are some foods and drinks that may worsen your symptoms:  Vegetables  Tomatoes. Tomato juice. Tomato and spaghetti sauce. Chili peppers. Onion and garlic. Horseradish.  Fruits  Oranges, grapefruit, and lemon (fruit and juice).  Meats  High-fat meats, fish, and poultry. This includes hot dogs, ribs, ham, sausage, salami, and virgen.  Dairy  Whole milk and chocolate milk. Sour cream. Cream. Butter. Ice cream. Cream cheese.   Beverages  Coffee and tea, with or without caffeine. Carbonated beverages or energy drinks.  Condiments  Hot sauce. Barbecue sauce.   Sweets/Desserts  Chocolate and cocoa. Donuts. Peppermint and spearmint.  Fats and Oils  High-fat foods, including French fries and potato chips.  Other  Vinegar. Strong spices, such as black pepper, white pepper, red pepper, cayenne, gonzalez powder, cloves, ginger, and chili powder.  The items listed above may not be a complete list of foods and beverages to avoid. Contact your dietitian for more  information.     This information is not intended to replace advice given to you by your health care provider. Make sure you discuss any questions you have with your health care provider.     Document Released: 12/18/2006 Document Revised: 01/08/2016 Document Reviewed: 10/22/2014  [employer] Interactive Patient Education ©2016 [employer]

## 2018-04-25 ENCOUNTER — OFFICE VISIT (OUTPATIENT)
Dept: INTERNAL MEDICINE | Facility: MEDICAL CENTER | Age: 48
End: 2018-04-25
Payer: COMMERCIAL

## 2018-04-25 VITALS
BODY MASS INDEX: 25.76 KG/M2 | WEIGHT: 207.2 LBS | DIASTOLIC BLOOD PRESSURE: 100 MMHG | TEMPERATURE: 98.2 F | HEART RATE: 63 BPM | SYSTOLIC BLOOD PRESSURE: 144 MMHG | HEIGHT: 75 IN | OXYGEN SATURATION: 97 %

## 2018-04-25 DIAGNOSIS — Z79.891 CHRONIC USE OF OPIATE DRUGS THERAPEUTIC PURPOSES: ICD-10-CM

## 2018-04-25 DIAGNOSIS — G89.29 CHRONIC MIDLINE LOW BACK PAIN WITHOUT SCIATICA: ICD-10-CM

## 2018-04-25 DIAGNOSIS — G89.29 CHRONIC RIGHT-SIDED LOW BACK PAIN WITHOUT SCIATICA: ICD-10-CM

## 2018-04-25 DIAGNOSIS — M54.17 LUMBOSACRAL RADICULOPATHY: ICD-10-CM

## 2018-04-25 DIAGNOSIS — M47.812 OSTEOARTHRITIS OF CERVICAL SPINE, UNSPECIFIED SPINAL OSTEOARTHRITIS COMPLICATION STATUS: ICD-10-CM

## 2018-04-25 DIAGNOSIS — M54.50 CHRONIC MIDLINE LOW BACK PAIN WITHOUT SCIATICA: ICD-10-CM

## 2018-04-25 DIAGNOSIS — R53.83 OTHER FATIGUE: ICD-10-CM

## 2018-04-25 DIAGNOSIS — R35.1 NOCTURIA: ICD-10-CM

## 2018-04-25 DIAGNOSIS — Z79.891 CHRONIC USE OF OPIATE FOR THERAPEUTIC PURPOSE: ICD-10-CM

## 2018-04-25 DIAGNOSIS — M54.50 CHRONIC RIGHT-SIDED LOW BACK PAIN WITHOUT SCIATICA: ICD-10-CM

## 2018-04-25 DIAGNOSIS — Z13.220 NEED FOR LIPID SCREENING: ICD-10-CM

## 2018-04-25 DIAGNOSIS — Z00.00 HEALTHCARE MAINTENANCE: ICD-10-CM

## 2018-04-25 DIAGNOSIS — Z91.09 SENSITIVITY TO SUNLIGHT: ICD-10-CM

## 2018-04-25 PROCEDURE — 99213 OFFICE O/P EST LOW 20 MIN: CPT | Mod: GE | Performed by: INTERNAL MEDICINE

## 2018-04-25 RX ORDER — OXYCODONE HYDROCHLORIDE 5 MG/1
5 TABLET ORAL NIGHTLY PRN
Qty: 30 TAB | Refills: 0 | Status: SHIPPED | OUTPATIENT
Start: 2018-05-25 | End: 2018-06-24

## 2018-04-25 RX ORDER — OXYCODONE HYDROCHLORIDE 5 MG/1
5 TABLET ORAL EVERY 4 HOURS PRN
Qty: 30 TAB | Refills: 0 | Status: SHIPPED | OUTPATIENT
Start: 2018-06-24 | End: 2018-04-25 | Stop reason: SDUPTHER

## 2018-04-25 RX ORDER — OXYCODONE HYDROCHLORIDE 5 MG/1
5 TABLET ORAL NIGHTLY PRN
Qty: 30 TAB | Refills: 0 | Status: SHIPPED | OUTPATIENT
Start: 2018-06-24 | End: 2018-07-24

## 2018-04-25 RX ORDER — OXYCODONE HYDROCHLORIDE 5 MG/1
5 TABLET ORAL EVERY 4 HOURS PRN
Qty: 30 TAB | Refills: 0 | Status: SHIPPED | OUTPATIENT
Start: 2018-05-25 | End: 2018-04-25 | Stop reason: SDUPTHER

## 2018-04-25 RX ORDER — OXYCODONE HYDROCHLORIDE 5 MG/1
5 TABLET ORAL
Qty: 30 TAB | Refills: 0 | Status: SHIPPED | OUTPATIENT
Start: 2018-04-25 | End: 2018-05-25

## 2018-04-25 NOTE — PROGRESS NOTES
Established Patient    Gabriele presents today with the following:    CC: Chronic pain management and pain medication refill    HPI: NINI is a 47-year-old male who presents to the clinic for medication refill due to his low lower back pain. Patient has a past medical history significant for cervical spondylosis and chronic lower back pain. BJ has a long history of cervical spondylosis characterized by an MRI that shows multilevel degenerative disc disease. On a previous visit, he was sent to a pain management specialist who told him that an epidural or trigger point injection were not indicated and that he needed to simply stay on his oxycodone that he takes roughly once a day for management of severe muscle spasm that occur on a unpredictable basis.     Patient has a prior history of small CVA documented by MRI involving the cerebellum in the posterior inferior cerebral artery thought to be due to trauma while performing sport activities he's had no recurrent strokelike or TIA-like symptomology and his neurological status has completely resolved. As part of his workup for his posterior cerebellar stroke in the past, he had a CT angiogram was entirely normal and then had an MRI of the cervical spine due to chronic neck pain that showed an incidental thyroid cyst.     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 He participates in various intramural sports, as well as participate as basketball and  for his kids. Patient reports no side effects from narcotic use, he denies any lethargy and her somnolence. Patient described that he currently uses 5 mg to 2.5 mg daily at bedtime for pain relief. Patient states that if he does not take the pain medication his back gives out and he is unable to walk or perform any of his ADLs.    On last visit patient signed a pain contract and his urine drug screen was consistent with the medication that she was prescribed.  Patient reports no side effects to the medication. On previous visit patient was started on celecoxib for possible pain relief as well, patient states that the celecoxib did not relieve any of his pain. He also has stopped taking his gabapentin since this is not resolved any of his pain. Patient is currently seeking nonpharmacological pain management through yoga and stretching.    Patient was concerned about having a healthcare checkup and having his labs drawn today.    Patient Active Problem List    Diagnosis Date Noted   • Chronic lower back pain 01/10/2018   • Chronic use of opiate drugs therapeutic purposes 03/10/2017   • Cervical spondylosis 12/29/2016   • Encounter for smoking cessation counseling 12/29/2016   • Preventative health care 12/29/2016   • Radiculopathy 05/10/2016   • Cerebellar infarct (HCC) 04/06/2013   • Vertigo 04/05/2013       Current Outpatient Prescriptions   Medication Sig Dispense Refill   • oxyCODONE immediate-release (ROXICODONE) 5 MG Tab Take 1 Tab by mouth 1 time daily as needed for Severe Pain for up to 30 days. 30 Tab 0   • [START ON 5/25/2018] oxyCODONE immediate-release (ROXICODONE) 5 MG Tab Take 1 Tab by mouth at bedtime as needed for Severe Pain for up to 30 days. 30 Tab 0   • [START ON 6/24/2018] oxyCODONE immediate-release (ROXICODONE) 5 MG Tab Take 1 Tab by mouth at bedtime as needed for Severe Pain for up to 30 days. 30 Tab 0   • MILI ROOT PO Take  by mouth.     • Krill Oil 300 MG Cap Take  by mouth.     • therapeutic multivitamin-minerals (THERAGRAN-M) Tab Take 1 Tab by mouth every day.     • Omega-3 Fatty Acids (FISH OIL) 1000 MG Cap capsule Take 1,000 mg by mouth 3 times a day, with meals.     • Tadalafil 2.5 MG Tab Take 2 Tabs by mouth 1 time daily as needed. 30 Tab 2     No current facility-administered medications for this visit.        ROS: As per HPI. Additional pertinent symptoms as noted below.    All others negative    /100   Pulse 63   Temp 36.8 °C (98.2  "°F)   Ht 1.905 m (6' 3\")   Wt 94 kg (207 lb 3.2 oz)   SpO2 97%   BMI 25.90 kg/m²     Physical Exam   Constitutional:  oriented to person, place, and time. No distress.   Eyes: Pupils are equal, round, and reactive to light. No scleral icterus.  Neck: Neck supple. No thyromegaly present.   Cardiovascular: Normal rate, regular rhythm and normal heart sounds.  Exam reveals no gallop and no friction rub.  No murmur heard.  Pulmonary/Chest: Breath sounds normal. Chest wall is not dull to percussion.   Musculoskeletal: Pain on examination of cervical spine and lumbar spine.  Lymphadenopathy: no cervical adenopathy  Neurological: alert and oriented to person, place, and time.   Skin: No cyanosis. Nails show no clubbing.      Note: I have reviewed all pertinent labs and diagnostic tests associated with this visit with specific comments listed under the assessment and plan below    Assessment and Plan    1. Chronic right-sided low back pain without sciatica  2. Chronic use of opiate drugs therapeutic purposes  3. Osteoarthritis of cervical spine, unspecified spinal osteoarthritis complication status  4. Lumbosacral radiculopathy  5. Chronic midline low back pain without sciatica  6. Chronic use of opiate for therapeutic purpose  Patient was prescribed 3 more months of Roxicodone 5 mg daily at bedtime when necessary, he was counseled to decrease the amount of pain medication that she is taking. Urine drug screen was going to be ordered today however patient reported that he had just urinated prior to coming into clinic.  Plan  -Patient describes 3 more months of Roxicodone as outlined above  -Patient to have UDS next visit    7. Healthcare maintenance  Patient wishes to have healthcare screening and maintenance. Patient had labs drawn in January however records weren't available at this time.  Plan  - CBC, CMP, TSH with reflex to FT4, lipid profile, and vitamin D labs were ordered.    8. Nocturia  - Patient reports waking " up once at 4 AM in the morning to urinate nightly. Patient denies frequency, weak stream, incomplete voiding, or any other BPH like symptoms.  Plan  -Continue to monitor    9. Sensitivity to sunlight  Patient has noted that for the past 8 months he has become increasingly more sensitive to sunlight. This could be secondary to the patient being a  for his 8 children  Plan  -Patient counseled on sunscreen application daily    10. Need for lipid screening  Patient does not remove the last time he was screened for his lipids.  Plan  -Lipid panel ordered    11. Other fatigue  Patient reports fatigue, states feeling a little bit more tired than usual. This could be due to the patient having a very busy schedule and participating in various scholastic activities with his 8 children.  Plan  -Vitamin D levels ordered      Followup: Return in about 10 weeks (around 7/4/2018), or if symptoms worsen or fail to improve, for Short.      Signed by: Gokul Ferrara M.D.

## 2018-04-25 NOTE — PATIENT INSTRUCTIONS
What You Need to Know About Prescription Opioid Pain Medicine  Opioids are powerful medicines that are used to treat moderate to severe pain. Opioids should be taken with the supervision of a trained health care provider. They should be taken for the shortest period of time as possible. This is because opioids can be addictive and the longer you take opioids, the greater your risk of addiction (opioid use disorder).  What do opioids do?  Opioids help reduce or eliminate pain. When used for short periods of time, they can help you:  · Sleep better.  · Do better in physical or occupational therapy.  · Feel better in the first few days after an injury.  · Recover from surgery.  What kind of problems can opioids cause?  Opioids can cause side effects, such as:  · Constipation.  · Nausea.  · Vomiting.  · Drowsiness.  · Confusion.  · Opioid use disorder.  · Breathing difficulties (respiratory depression).  Using opioid pain medicines for longer than 3 days increases your risk of these side effects.  Taking opioid pain medicine for a long period of time can affect your ability to do daily tasks. It also puts you at risk for:  · Car accidents.  · Heart attack.  · Overdose, which can sometimes lead to death.  What can increase my risk for developing problems while taking opioids?  You may be at an especially high risk for problems while taking opioids if you:  · Are over the age of 65.  · Are pregnant.  · Have kidney or liver disease.  · Have certain mental health conditions, such as depression or anxiety.  · Have a history of substance use disorder.  · Have had an opioid overdose in the past.  How do I stop taking opioids if I have been taking them for a long time?  If you have been taking opioid medicine for more than a few weeks, you may need to slowly stop taking them (taper). Tapering your use of opioids can decrease your chances of experiencing withdrawal symptoms, such as:  · Abdominal pain and  cramping.  · Nausea.  · Sweating.  · Sleepiness.  · Restlessness.  · Uncontrollable shaking (tremors).  · Cravings for the medicine.  Do not attempt to taper your use of opioids on your own. Talk with your health care provider about how to do this. Your health care provider may prescribe a step-down schedule based on how much medicine you are taking and how long you have been taking it.  What are the benefits of stopping the use of opioids?  By switching from opioid pain medicine to non-opioid pain management options, you will decrease your risk of accidents and injuries associated with long-term opioid use. You will also be able to:  · Monitor your pain more accurately and know when to seek medical care if it is not improving.  · Decrease risk to others around you. Having opioids in the home increases the risk for accidental or intentional use or overdose by others.  How can I treat pain without opioids?  Pain can be managed with many types of alternative treatments. Ask your health care provider to refer you to one or more specialists who can help you manage pain through:  · Physical or occupational therapy.  · Counseling (cognitive-behavioral therapy).  · Good nutrition.  · Biofeedback.  · Massage.  · Meditation.  · Non-opioid medicine.  · Following a gentle exercise program.  Where can I get support?  If you have been taking opioids for a long time, you may benefit from receiving support for quitting from a local support group or counselor. Ask your health care provider for a referral to these resources in your area.  When should I seek medical care?  Seek medical care right away if you are taking opioids and you experience any of the following:  · Difficulty breathing.  · Breathing that is more shallow or slower than normal.  · A very slow heartbeat (pulse).  · Severe confusion.  · Unconsciousness.  · Sleepiness.  · Difficulty waking from sleep.  · Slurred speech.  · Nausea and vomiting.  · Cold, clammy  skin.  · Blue lips or fingernails.  · Limpness.  · Abnormally small pupils.  If you think that you or someone else may have taken too much of an opioid medicine, get medical help right away. Do not wait to see if the symptoms go away on their own. Call your local emergency services (798 in the [place] or call the hotline of the National Poison Control Center (544-044-7458 in the [place]   Where can I get more information?  To learn more about opioid medicines, visit the Centers for Disease Control and Prevention web site Opioid Basics at https://www.cdc.gov/drugoverdos/opioids/index.html.  Summary  · Opioid medicines can help you manage moderate-to-severe pain for a short period of time.  · Taking opioid pain medicine for a long period of time puts you at risk for unintentional accidents, injury, and even death.  · If you think that you or someone else may have taken too much of an opioid, get medical help right away.  This information is not intended to replace advice given to you by your health care provider. Make sure you discuss any questions you have with your health care provider.  Document Released: 01/13/2017 Document Revised: 08/11/2017 Document Reviewed: 07/29/2016  Elsevier Interactive Patient Education © 2017 [employer]

## 2018-07-02 ENCOUNTER — APPOINTMENT (OUTPATIENT)
Dept: INTERNAL MEDICINE | Facility: MEDICAL CENTER | Age: 48
End: 2018-07-02
Payer: COMMERCIAL

## 2018-07-18 DIAGNOSIS — Z79.891 CHRONIC USE OF OPIATE FOR THERAPEUTIC PURPOSE: ICD-10-CM

## 2018-07-18 DIAGNOSIS — M47.812 OSTEOARTHRITIS OF CERVICAL SPINE, UNSPECIFIED SPINAL OSTEOARTHRITIS COMPLICATION STATUS: ICD-10-CM

## 2018-07-18 DIAGNOSIS — G89.29 CHRONIC RIGHT-SIDED LOW BACK PAIN WITHOUT SCIATICA: ICD-10-CM

## 2018-07-18 DIAGNOSIS — Z13.220 NEED FOR LIPID SCREENING: ICD-10-CM

## 2018-07-18 DIAGNOSIS — Z00.00 HEALTHCARE MAINTENANCE: ICD-10-CM

## 2018-07-18 DIAGNOSIS — G89.29 CHRONIC MIDLINE LOW BACK PAIN WITHOUT SCIATICA: ICD-10-CM

## 2018-07-18 DIAGNOSIS — R35.1 NOCTURIA: ICD-10-CM

## 2018-07-18 DIAGNOSIS — Z91.09 SENSITIVITY TO SUNLIGHT: ICD-10-CM

## 2018-07-18 DIAGNOSIS — Z79.891 CHRONIC USE OF OPIATE DRUGS THERAPEUTIC PURPOSES: ICD-10-CM

## 2018-07-18 DIAGNOSIS — Z13.220 SCREENING, LIPID: ICD-10-CM

## 2018-07-18 DIAGNOSIS — N52.9 ERECTILE DYSFUNCTION, UNSPECIFIED ERECTILE DYSFUNCTION TYPE: ICD-10-CM

## 2018-07-18 DIAGNOSIS — M54.17 LUMBOSACRAL RADICULOPATHY: ICD-10-CM

## 2018-07-18 DIAGNOSIS — M54.50 CHRONIC RIGHT-SIDED LOW BACK PAIN WITHOUT SCIATICA: ICD-10-CM

## 2018-07-18 DIAGNOSIS — M54.50 CHRONIC MIDLINE LOW BACK PAIN WITHOUT SCIATICA: ICD-10-CM

## 2018-07-18 DIAGNOSIS — R53.83 OTHER FATIGUE: ICD-10-CM

## 2018-07-25 ENCOUNTER — OFFICE VISIT (OUTPATIENT)
Dept: INTERNAL MEDICINE | Facility: MEDICAL CENTER | Age: 48
End: 2018-07-25
Payer: COMMERCIAL

## 2018-07-25 VITALS
WEIGHT: 207 LBS | DIASTOLIC BLOOD PRESSURE: 88 MMHG | TEMPERATURE: 97.7 F | BODY MASS INDEX: 25.74 KG/M2 | SYSTOLIC BLOOD PRESSURE: 138 MMHG | HEART RATE: 60 BPM | OXYGEN SATURATION: 98 % | RESPIRATION RATE: 17 BRPM | HEIGHT: 75 IN

## 2018-07-25 DIAGNOSIS — Z78.9 ALCOHOL USE: ICD-10-CM

## 2018-07-25 DIAGNOSIS — M54.17 LUMBOSACRAL RADICULOPATHY: Primary | ICD-10-CM

## 2018-07-25 DIAGNOSIS — M54.50 CHRONIC RIGHT-SIDED LOW BACK PAIN WITHOUT SCIATICA: ICD-10-CM

## 2018-07-25 DIAGNOSIS — M47.812 OSTEOARTHRITIS OF CERVICAL SPINE, UNSPECIFIED SPINAL OSTEOARTHRITIS COMPLICATION STATUS: ICD-10-CM

## 2018-07-25 DIAGNOSIS — Z79.891 CHRONIC USE OF OPIATE DRUGS THERAPEUTIC PURPOSES: ICD-10-CM

## 2018-07-25 DIAGNOSIS — G89.29 CHRONIC RIGHT-SIDED LOW BACK PAIN WITHOUT SCIATICA: ICD-10-CM

## 2018-07-25 DIAGNOSIS — E78.5 DYSLIPIDEMIA: ICD-10-CM

## 2018-07-25 PROCEDURE — 99214 OFFICE O/P EST MOD 30 MIN: CPT | Mod: GC | Performed by: INTERNAL MEDICINE

## 2018-07-25 PROCEDURE — 99000 SPECIMEN HANDLING OFFICE-LAB: CPT | Performed by: INTERNAL MEDICINE

## 2018-07-25 RX ORDER — OXYCODONE HYDROCHLORIDE AND ACETAMINOPHEN 5; 325 MG/1; MG/1
1-2 TABLET ORAL EVERY 4 HOURS PRN
COMMUNITY
End: 2018-07-25

## 2018-07-25 RX ORDER — OXYCODONE HYDROCHLORIDE 5 MG/1
5 TABLET ORAL EVERY 4 HOURS PRN
COMMUNITY
End: 2018-07-25 | Stop reason: SDUPTHER

## 2018-07-25 RX ORDER — OXYCODONE HYDROCHLORIDE 5 MG/1
5 TABLET ORAL
Qty: 25 TAB | Refills: 0 | Status: SHIPPED | OUTPATIENT
Start: 2018-07-25 | End: 2018-08-24

## 2018-07-25 ASSESSMENT — PAIN SCALES - GENERAL: PAINLEVEL: 4=SLIGHT-MODERATE PAIN

## 2018-07-25 NOTE — PATIENT INSTRUCTIONS
Cholesterol  Cholesterol is a white, waxy, fat-like substance needed by your body in small amounts. The liver makes all the cholesterol you need. Cholesterol is carried from the liver by the blood through the blood vessels. Deposits of cholesterol (plaque) may build up on blood vessel walls. These make the arteries narrower and stiffer. Cholesterol plaques increase the risk for heart attack and stroke.   You cannot feel your cholesterol level even if it is very high. The only way to know it is high is with a blood test. Once you know your cholesterol levels, you should keep a record of the test results. Work with your health care provider to keep your levels in the desired range.   WHAT DO THE RESULTS MEAN?  · Total cholesterol is a rough measure of all the cholesterol in your blood.    · LDL is the so-called bad cholesterol. This is the type that deposits cholesterol in the walls of the arteries. You want this level to be low.    · HDL is the good cholesterol because it cleans the arteries and carries the LDL away. You want this level to be high.  · Triglycerides are fat that the body can either burn for energy or store. High levels are closely linked to heart disease.    WHAT ARE THE DESIRED LEVELS OF CHOLESTEROL?  · Total cholesterol below 200.    · LDL below 100 for people at risk, below 70 for those at very high risk.    · HDL above 50 is good, above 60 is best.    · Triglycerides below 150.    HOW CAN I LOWER MY CHOLESTEROL?  · Diet. Follow your diet programs as directed by your health care provider.    ¨ Choose fish or white meat chicken and turkey, roasted or baked. Limit fatty cuts of red meat, fried foods, and processed meats, such as sausage and lunch meats.    ¨ Eat lots of fresh fruits and vegetables.  ¨ Choose whole grains, beans, pasta, potatoes, and cereals.    ¨ Use only small amounts of olive, corn, or canola oils.    ¨ Avoid butter, mayonnaise, shortening, or palm kernel oils.  ¨ Avoid foods with  trans fats.    ¨ Drink skim or nonfat milk and eat low-fat or nonfat yogurt and cheeses. Avoid whole milk, cream, ice cream, egg yolks, and full-fat cheeses.    ¨ Healthy desserts include lindsay food cake, yane snaps, animal crackers, hard candy, popsicles, and low-fat or nonfat frozen yogurt. Avoid pastries, cakes, pies, and cookies.    · Exercise. Follow your exercise programs as directed by your health care provider.    ¨ A regular program helps decrease LDL and raise HDL.    ¨ A regular program helps with weight control.    ¨ Do things that increase your activity level like gardening, walking, or taking the stairs. Ask your health care provider about how you can be more active in your daily life.    · Medicine. Take medicine only as directed by your health care provider.    ¨ Medicine may be prescribed by your health care provider to help lower cholesterol and decrease the risk for heart disease.    ¨ If you have several risk factors, you may need medicine even if your levels are normal.     This information is not intended to replace advice given to you by your health care provider. Make sure you discuss any questions you have with your health care provider.     Document Released: 09/12/2002 Document Revised: 01/08/2016 Document Reviewed: 10/01/2014  [employer] Interactive Patient Education ©2016 [employer]

## 2018-07-25 NOTE — PROGRESS NOTES
Established Patient    Gabriele presents today with the following:    CC: refill opioid medication, check labs     HPI:   Mr. Bland is a 46 yo M with pmhx of chronic back pain is here for refill of opioid medication. Reported being on opioid for almost 10 years. Mentioned is taking for his chronic lower back and midthoracic pain Q day. Reported is functioning well, good quality of life, able to perform his ADLs and take care of his 8 kids without being in too much pain while on opioid medications. Mentioned that tried other alternatives such as NSAIDs, tylenol, gabapentin, TCAs, antispasmotics for years w/o benefit and some cause side effects such as GERD from NSAIDs. Pt has a long history of cervical spondylosis characterized by an MRI that shows multilevel degenerative disc disease. On previous visit, he was sent to a pain management specialist who told him that an epidural or trigger point injection were not indicated and that he needed to simply stay on his oxycodone that he takes roughly once a day for management of severe muscle spasm that occur on a unpredictable basis. 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 He participates in various intramural sports, as well as participate as basketball and  for his kids. Patient reports no side effects from narcotic use, he denies any lethargy or somnolence. Patient described that he currently uses 5 mg daily ( 2.5 mg early evening and 2.5 mg at bedtime) for pain relief. Patient states that if he does not take the pain medication his back pain is getting wore and he is unable to perform any of his ADLs. Last time prescribed 90 pills of 5 mg oxycodone on 4/25/18. Pt reported that he also enjoy socializing with his wife with drinking 1-2 glass of wine and smoking 4 cigarette at night.   Pt signed a pain contract on 1/19/18 and his urine drug screen 10/2017  was consistent with the opioid medication prescribed,  however, also positive for alcohol. Patient is currently seeking nonpharmacological pain management through yoga and stretching.    Pt has a prior history of small CVA documented by MRI involving the cerebellum in the posterior inferior cerebral artery in 4/2013, thought to be due to trauma while performing sport activities. His CT angiogram 4/2013 was entirely alexis. Pt has no recurrent stroke or TIA-like symptoms and his neurological status has completely resolved.   His last lipid panel 7/2018 showed cholesterol of 238, , HDL 56 and . Otherwise his CBC/CMP and vit D are wnl. His ASCVD score of 9.3%, no hx of HTN or DM2, however, currently active smoker. Pt reported of eating fast food at work and big meal at night, mixed with vegetables.       Patient Active Problem List    Diagnosis Date Noted   • Dyslipidemia 07/25/2018   • Chronic lower back pain 01/10/2018   • Chronic use of opiate drugs therapeutic purposes 03/10/2017   • Cervical spondylosis 12/29/2016   • Encounter for smoking cessation counseling 12/29/2016   • Preventative health care 12/29/2016   • Radiculopathy 05/10/2016   • Cerebellar infarct (HCC) 04/06/2013   • Vertigo 04/05/2013       Current Outpatient Prescriptions   Medication Sig Dispense Refill   • oxyCODONE-acetaminophen (PERCOCET) 5-325 MG Tab Take 1-2 Tabs by mouth every four hours as needed.     • oxyCODONE immediate-release (ROXICODONE) 5 MG Tab Take 1 Tab by mouth 1 time daily as needed for Severe Pain for up to 30 days. 25 Tab 0   • MILI ROOT PO Take  by mouth.     • Krill Oil 300 MG Cap Take  by mouth.     • therapeutic multivitamin-minerals (THERAGRAN-M) Tab Take 1 Tab by mouth every day.     • Omega-3 Fatty Acids (FISH OIL) 1000 MG Cap capsule Take 1,000 mg by mouth 3 times a day, with meals.     • Tadalafil 2.5 MG Tab Take 2 Tabs by mouth 1 time daily as needed. 30 Tab 2     No current facility-administered medications for this visit.      Past Medical History:  "  Diagnosis Date   • Chronic neck pain    • Conjunctivitis    • CVA (cerebral vascular accident) (HCC)    • Decreased libido    • Elevated blood-pressure reading without diagnosis of hypertension    • Erectile dysfunction    • Fatigue    • Lumbar radiculopathy    • Other specified injury of unspecified achilles tendon, sequela    • Preventative health care    • Stroke (HCC)    • Tobacco dependency    • Traumatic injury to skin or subcutaneous tissue      Past Surgical History:   Procedure Laterality Date   • HERNIA REPAIR       Social History     Social History   • Marital status:      Spouse name: N/A   • Number of children: N/A   • Years of education: N/A     Occupational History   • Not on file.     Social History Main Topics   • Smoking status: Current Every Day Smoker     Packs/day: 0.25     Years: 20.00     Types: Cigarettes   • Smokeless tobacco: Never Used      Comment: 4 cig/day   • Alcohol use 8.4 oz/week     14 Glasses of wine per week      Comment: glass wine/night   • Drug use: No   • Sexual activity: Yes     Other Topics Concern   • Not on file     Social History Narrative   • No narrative on file     Family History   Problem Relation Age of Onset   • Diabetes Father    • Psychiatry Mother          ROS: As per HPI. Otherwise unremarkable       /88 Comment: repeated 5 min bp  Pulse 60   Temp 36.5 °C (97.7 °F)   Resp 17   Ht 1.905 m (6' 3\")   Wt 93.9 kg (207 lb)   SpO2 98%   BMI 25.87 kg/m²     Physical Exam   Constitutional:  oriented to person, place, and time. No distress.   Eyes: Pupils are equal, round, and reactive to light. No scleral icterus.  Neck: Neck supple. No thyromegaly present.   Cardiovascular: Normal rate, regular rhythm and normal heart sounds.  Exam reveals no gallop and no friction rub.  No murmur heard.  Pulmonary/Chest: Breath sounds normal. Chest wall is not dull to percussion.   Abdomen: Soft, NT/ND + BS, no masses, no suprapubic tenderness, no " hepatomegaly.  Musculoskeletal: lower back/both shoulders/neck with both passive and active ROM within normal limit, no joint swelling/tenderness. no edema.   Lymphadenopathy: no cervical adenopathy  Neurological: alert and oriented to person, place, and time. strength: 5/5 b/l both UE and LE,  Normal sensation to touch and DTR: 2+ b/l UE and LE. Gait wnl. CN 2-12 grossly normal.   Skin: No cyanosis. Nails show no clubbing.        Assessment and Plan    Osteoarthritis of cervical spine  Chronic midline low back pain without sciatica  Chronic right-sided low back pain without sciatica  Chronic use of opiate for therapeutic purpose  Alcohol use   -pt has extensive, long discussion regarding chronic opioid use, their side effect, risks including depence, tolerance, addiction, as well as other side effects. Pt understand the risks  -prescribed 1 month of Roxicodone 5 mg daily at bedtime when necessary, he was counseled to decrease the amount of pain medication from 30 pills/month to 25 pills/month. Pt agreed with the plan. Pt also informed that the amount will be decrease each month from 25 pills/month>> 20 pills/month if doing fine with previous regimen. Pt agreed  -Urine drug screen ordered today after convincing to make some urine. Pt perfomed  - [facility] PAIN MANAGEMENT SCREEN; Future  - oxyCODONE immediate-release (ROXICODONE) 5 MG Tab; Take 1 Tab by mouth 1 time daily as needed for Severe Pain for up to 30 days.  Dispense: 25 Tab; Refill: 0  - CONSENT FOR OPIATE PRESCRIPTION  -pt educated regarding risk of drinking alcohol drink ( 1-2 glass of wine/day) combined with narcotics. Pt educated to cut down alcohol to lessen the risks. Pt understand,however disagree to cut down wine for now.   Opioid Risk Score: 3  Interpretation of Opioid Risk Score   Score 0-3 = Low risk of abuse. Do UDS at least once per year.  Score 4-7 = Moderate risk of abuse. Do UDS 1-4 times per year.  Score 8+ = High risk of abuse. Refer to  specialist.        Dyslipidemia  Hx of stroke  Smoking cigarette   -prior history of small CVA  - MRI: Acute right cerebellar infarction in the right pica territory in 4/2013  - CT angiogram 4/2013 was entirely alexis  - no recurrent stroke or TIA-like symptoms and his neurological status has completely resolved.   -lipid panel 7/2018 showed cholesterol of 238, , HDL 56 and .   -ASCVD score of 9.3%, no hx of HTN or DM2, however, currently active smoker. Pt reported of eating fast food at work and big meal at night, mixed with vegetables.   -pt refuse taking statin for now, has desire to eat healthier  -encourage eating healthy food, exercise regularly and avoid unhealthy food   -Pt has no active desire to quit now and declined nicotine replacement    -Encouraged smoking cessation  -CTM and f/u with support to quit           Signed by: Ashvin Lock M.D.

## 2018-08-17 DIAGNOSIS — M47.812 OSTEOARTHRITIS OF CERVICAL SPINE, UNSPECIFIED SPINAL OSTEOARTHRITIS COMPLICATION STATUS: ICD-10-CM

## 2018-08-17 DIAGNOSIS — M54.17 LUMBOSACRAL RADICULOPATHY: ICD-10-CM

## 2018-08-17 DIAGNOSIS — M54.50 CHRONIC RIGHT-SIDED LOW BACK PAIN WITHOUT SCIATICA: ICD-10-CM

## 2018-08-17 DIAGNOSIS — G89.29 CHRONIC RIGHT-SIDED LOW BACK PAIN WITHOUT SCIATICA: ICD-10-CM

## 2018-08-17 DIAGNOSIS — Z79.891 CHRONIC USE OF OPIATE DRUGS THERAPEUTIC PURPOSES: ICD-10-CM

## 2018-08-30 ENCOUNTER — TELEPHONE (OUTPATIENT)
Dept: INTERNAL MEDICINE | Facility: MEDICAL CENTER | Age: 48
End: 2018-08-30

## 2018-08-30 NOTE — TELEPHONE ENCOUNTER
Patient requesting Oxycodone; advised patient to make appointment. Patient stated you told him no need to be seen, please advice.

## 2018-09-14 ENCOUNTER — OFFICE VISIT (OUTPATIENT)
Dept: INTERNAL MEDICINE | Facility: MEDICAL CENTER | Age: 48
End: 2018-09-14
Payer: COMMERCIAL

## 2018-09-14 VITALS
TEMPERATURE: 98.3 F | HEIGHT: 75 IN | SYSTOLIC BLOOD PRESSURE: 170 MMHG | WEIGHT: 212.8 LBS | BODY MASS INDEX: 26.46 KG/M2 | DIASTOLIC BLOOD PRESSURE: 110 MMHG | HEART RATE: 73 BPM | OXYGEN SATURATION: 99 %

## 2018-09-14 DIAGNOSIS — Z78.9 ALCOHOL USE: ICD-10-CM

## 2018-09-14 DIAGNOSIS — Z79.891 CHRONIC USE OF OPIATE DRUGS THERAPEUTIC PURPOSES: ICD-10-CM

## 2018-09-14 DIAGNOSIS — R03.0 ELEVATED BLOOD PRESSURE READING: ICD-10-CM

## 2018-09-14 DIAGNOSIS — M54.17 LUMBOSACRAL RADICULOPATHY: ICD-10-CM

## 2018-09-14 PROCEDURE — 99213 OFFICE O/P EST LOW 20 MIN: CPT | Mod: GE | Performed by: INTERNAL MEDICINE

## 2018-09-14 RX ORDER — OXYCODONE HYDROCHLORIDE 5 MG/1
5 TABLET ORAL EVERY 4 HOURS PRN
COMMUNITY

## 2018-09-14 RX ORDER — PREGABALIN 50 MG/1
50 CAPSULE ORAL 2 TIMES DAILY
Qty: 60 CAP | Refills: 1 | Status: SHIPPED | OUTPATIENT
Start: 2018-09-14 | End: 2018-11-13

## 2018-09-14 ASSESSMENT — PATIENT HEALTH QUESTIONNAIRE - PHQ9: CLINICAL INTERPRETATION OF PHQ2 SCORE: 0

## 2018-09-14 NOTE — PROGRESS NOTES
Established Patient    Gabriele presents today with the following:    CC: Pain medication refill     HPI: Mr Bland is a 46 yo male with PMH significant for cervical spondylosis and chronic lower back pain presents today for med refill. He has a long history of cervical spondylosis characterized by an MRI that shows multilevel degenerative disc disease. On a previous visit, he was sent to a pain management specialist who told him that an epidural or trigger point injection were not indicated and that he needed to simply stay on his oxycodone that he takes roughly once a day for management of severe muscle spasm that occur on a unpredictable basis.  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 He participates in various intramural sports, as well as participate as basketball and  for his kids. Patient reports no side effects from narcotic use, he denies any lethargy and her somnolence. Patient described that he currently uses 5 mg to 2.5 mg daily at bedtime for pain relief. Patient states that if he does not take the pain medication his back gives out and he is unable to walk or perform any of his ADLs.    He has tried NSAIDs (meloxicam, ibuprofen, celecoxib) which caused severe heart burn. He  also tried gabapentin which did not work. He has done physical therapy in the past she did not help much. During the previous visit in April he was advised not to use alcohol and opiates together after her urine drug screen was positive for opiates and alcohol. He states that many years ago after he had the stroke since he could not take aspirin or Plavix he was told to take a glass of wine daily by physician.       Patient Active Problem List    Diagnosis Date Noted   • Dyslipidemia 07/25/2018   • Alcohol use 07/25/2018   • Chronic lower back pain 01/10/2018   • Chronic use of opiate drugs therapeutic purposes 03/10/2017   • Cervical spondylosis 12/29/2016   •  "Encounter for smoking cessation counseling 12/29/2016   • Preventative health care 12/29/2016   • Radiculopathy 05/10/2016   • Cerebellar infarct (HCC) 04/06/2013   • Vertigo 04/05/2013       Current Outpatient Prescriptions   Medication Sig Dispense Refill   • Tadalafil 2.5 MG Tab Take 2 Tabs by mouth 1 time daily as needed. 30 Tab 2   • MILI ROOT PO Take  by mouth.     • Krill Oil 300 MG Cap Take  by mouth.     • therapeutic multivitamin-minerals (THERAGRAN-M) Tab Take 1 Tab by mouth every day.     • Omega-3 Fatty Acids (FISH OIL) 1000 MG Cap capsule Take 1,000 mg by mouth 3 times a day, with meals.       No current facility-administered medications for this visit.        ROS: As per HPI. Additional pertinent symptoms as noted below.      BP (!) 170/110   Pulse 73   Temp 36.8 °C (98.3 °F)   Ht 1.905 m (6' 3\")   Wt 96.5 kg (212 lb 12.8 oz)   SpO2 99%   BMI 26.60 kg/m²     Physical Exam   Constitutional:  oriented to person, place, and time. No distress.   Eyes: Pupils are equal, round, and reactive to light. No scleral icterus.  Neck: Neck supple. No thyromegaly present.   Cardiovascular: Normal rate, regular rhythm and normal heart sounds.  Exam reveals no gallop and no friction rub.  No murmur heard.  Pulmonary/Chest: Breath sounds normal. Chest wall is not dull to percussion.   Musculoskeletal:   no edema.   Lymphadenopathy: no cervical adenopathy  Neurological: alert and oriented to person, place, and time.   Skin: No cyanosis. Nails show no clubbing.      Note: I have reviewed all pertinent labs and diagnostic tests associated with this visit with specific comments listed under the assessment and plan below    Assessment and Plan    1. Alcohol use  2. Chronic use of opiate drugs therapeutic purposes  3. Lumbosacral radiculopathy  Patient had been receiving prescription for opioid from this clinic, started on a taper from last month.   The urine drug screen collected during previous visit is positive for " opiate acetaminophen and ethanol derivatives. He did have a previous urine drug screen in October 2017 with ethanol and opiates. He was counseled against using alcohol in combination with opiates during the visit in April 2018. Discussed with the patient that per their controlled substance agreement if there is concomitant use of opiates and other vehicles stimulating substances such as alcohol and marijuana, we will have to discontinue prescribing opiates.   Patient expresses frustration over having to see different providers at different visits, and getting different recommendations, such as he was told to use alcohol by a physician when he had the stroke few years ago since he could not take aspirin. He requested referral to pain management ([facility]) hoping he will be able to see one provider every time.     I did discuss nonopiate pain management. He is not open to use any NSAID including meloxicam. He used gabapentin in the past which did not make a difference.  He agrees for a trial of Lyrica, he will be a candidate since gabapentin failed. We will start with 50 mg twice a day, increased to 3 times a day in 2 weeks if he is tolerating. Educated about side effects, educational material given.    reviewed.       Followup: Return in about 1 month (around 10/14/2018) for Long.    Please note that this dictation was created using voice recognition software. I have made every reasonable attempt to correct obvious errors, but I expect that there are errors of grammar and possibly content that I did not discover before finalizing the note.      Signed by: Sushila Palacios M.D.

## 2018-09-14 NOTE — PATIENT INSTRUCTIONS
Pregabalin capsules  What is this medicine?  PREGABALIN (pre JENNIFER a javon) is used to treat nerve pain from diabetes, shingles, spinal cord injury, and fibromyalgia. It is also used to control seizures in epilepsy.  This medicine may be used for other purposes; ask your health care provider or pharmacist if you have questions.  COMMON BRAND NAME(S): Lyrica  What should I tell my health care provider before I take this medicine?  They need to know if you have any of these conditions:  -bleeding problems  -heart disease, including heart failure  -history of alcohol or drug abuse  -kidney disease  -suicidal thoughts, plans, or attempt; a previous suicide attempt by you or a family member  -an unusual or allergic reaction to pregabalin, gabapentin, other medicines, foods, dyes, or preservatives  -pregnant or trying to get pregnant or trying to conceive with your partner  -breast-feeding  How should I use this medicine?  Take this medicine by mouth with a glass of water. Follow the directions on the prescription label. You can take this medicine with or without food. Take your doses at regular intervals. Do not take your medicine more often than directed. Do not stop taking except on your doctor's advice.  A special MedGuide will be given to you by the pharmacist with each prescription and refill. Be sure to read this information carefully each time.  Talk to your pediatrician regarding the use of this medicine in children. Special care may be needed.  Overdosage: If you think you have taken too much of this medicine contact a poison control center or emergency room at once.  NOTE: This medicine is only for you. Do not share this medicine with others.  What if I miss a dose?  If you miss a dose, take it as soon as you can. If it is almost time for your next dose, take only that dose. Do not take double or extra doses.  What may interact with this medicine?  -alcohol  -certain medicines for blood pressure like captopril,  enalapril, or lisinopril  -certain medicines for diabetes, like pioglitazone or rosiglitazone  -certain medicines for anxiety or sleep  -narcotic medicines for pain  This list may not describe all possible interactions. Give your health care provider a list of all the medicines, herbs, non-prescription drugs, or dietary supplements you use. Also tell them if you smoke, drink alcohol, or use illegal drugs. Some items may interact with your medicine.  What should I watch for while using this medicine?  Tell your doctor or healthcare professional if your symptoms do not start to get better or if they get worse. Visit your doctor or health care professional for regular checks on your progress. Do not stop taking except on your doctor's advice. You may develop a severe reaction. Your doctor will tell you how much medicine to take.  Wear a medical identification bracelet or chain if you are taking this medicine for seizures, and carry a card that describes your disease and details of your medicine and dosage times.  You may get drowsy or dizzy. Do not drive, use machinery, or do anything that needs mental alertness until you know how this medicine affects you. Do not stand or sit up quickly, especially if you are an older patient. This reduces the risk of dizzy or fainting spells. Alcohol may interfere with the effect of this medicine. Avoid alcoholic drinks.  If you have a heart condition, like congestive heart failure, and notice that you are retaining water and have swelling in your hands or feet, contact your health care provider immediately.  The use of this medicine may increase the chance of suicidal thoughts or actions. Pay special attention to how you are responding while on this medicine. Any worsening of mood, or thoughts of suicide or dying should be reported to your health care professional right away.  This medicine has caused reduced sperm counts in some men. This may interfere with the ability to father a  child. You should talk to your doctor or health care professional if you are concerned about your fertility.  Women who become pregnant while using this medicine for seizures may enroll in the [place] Antiepileptic Drug Pregnancy Registry by calling 1-421.604.5454. This registry collects information about the safety of antiepileptic drug use during pregnancy.  What side effects may I notice from receiving this medicine?  Side effects that you should report to your doctor or health care professional as soon as possible:  -allergic reactions like skin rash, itching or hives, swelling of the face, lips, or tongue  -breathing problems  -changes in vision  -chest pain  -confusion  -jerking or unusual movements of any part of your body  -loss of memory  -muscle pain, tenderness, or weakness  -suicidal thoughts or other mood changes  -swelling of the ankles, feet, hands  -unusual bruising or bleeding  Side effects that usually do not require medical attention (report to your doctor or health care professional if they continue or are bothersome):  -dizziness  -drowsiness  -dry mouth  -headache  -nausea  -tremors  -trouble sleeping  -weight gain  This list may not describe all possible side effects. Call your doctor for medical advice about side effects. You may report side effects to FDA at 8-821-FDA-6762.  Where should I keep my medicine?  Keep out of the reach of children. This medicine can be abused. Keep your medicine in a safe place to protect it from theft. Do not share this medicine with anyone. Selling or giving away this medicine is dangerous and against the law.  This medicine may cause accidental overdose and death if it taken by other adults, children, or pets. Mix any unused medicine with a substance like cat litter or coffee grounds. Then throw the medicine away in a sealed container like a sealed bag or a coffee can with a lid. Do not use the medicine after the expiration date.  Store at room  temperature between 15 and 30 degrees C (59 and 86 degrees F).  NOTE: This sheet is a summary. It may not cover all possible information. If you have questions about this medicine, talk to your doctor, pharmacist, or health care provider.  © 2018 [employer] (2017-01-19 10:26:12)

## 2018-09-17 ENCOUNTER — APPOINTMENT (OUTPATIENT)
Dept: INTERNAL MEDICINE | Facility: MEDICAL CENTER | Age: 48
End: 2018-09-17
Payer: COMMERCIAL

## 2019-01-05 DIAGNOSIS — N52.9 ERECTILE DYSFUNCTION, UNSPECIFIED ERECTILE DYSFUNCTION TYPE: ICD-10-CM

## 2019-01-07 DIAGNOSIS — N52.9 ERECTILE DYSFUNCTION, UNSPECIFIED ERECTILE DYSFUNCTION TYPE: ICD-10-CM

## 2019-01-07 RX ORDER — TADALAFIL 2.5 MG/1
5 TABLET, FILM COATED ORAL
Refills: 0 | OUTPATIENT
Start: 2019-01-07

## 2019-01-08 NOTE — TELEPHONE ENCOUNTER
Was the patient seen in the last year in this department? No     Does patient have an active prescription for medications requested? No     Received Request Via: Pharmacy      Pt met protocol?: No pt last ov 10/2017 may be seeing another pcp

## 2019-01-10 RX ORDER — TADALAFIL 2.5 MG/1
5 TABLET ORAL
Qty: 30 TAB | Refills: 2 | Status: SHIPPED | OUTPATIENT
Start: 2019-01-10